# Patient Record
Sex: FEMALE | Race: WHITE | NOT HISPANIC OR LATINO | Employment: FULL TIME | URBAN - METROPOLITAN AREA
[De-identification: names, ages, dates, MRNs, and addresses within clinical notes are randomized per-mention and may not be internally consistent; named-entity substitution may affect disease eponyms.]

---

## 2023-02-27 ENCOUNTER — OFFICE VISIT (OUTPATIENT)
Age: 53
End: 2023-02-27

## 2023-02-27 VITALS
HEIGHT: 63 IN | TEMPERATURE: 98 F | HEART RATE: 92 BPM | DIASTOLIC BLOOD PRESSURE: 88 MMHG | OXYGEN SATURATION: 98 % | BODY MASS INDEX: 29.45 KG/M2 | RESPIRATION RATE: 14 BRPM | WEIGHT: 166.23 LBS | SYSTOLIC BLOOD PRESSURE: 132 MMHG

## 2023-02-27 DIAGNOSIS — Z00.00 ROUTINE GENERAL MEDICAL EXAMINATION AT A HEALTH CARE FACILITY: Primary | ICD-10-CM

## 2023-02-27 DIAGNOSIS — I10 PRIMARY HYPERTENSION: ICD-10-CM

## 2023-02-27 DIAGNOSIS — R73.03 PREDIABETES: ICD-10-CM

## 2023-02-27 DIAGNOSIS — E03.9 HYPOTHYROIDISM, UNSPECIFIED TYPE: ICD-10-CM

## 2023-02-27 DIAGNOSIS — Z12.4 PAP SMEAR FOR CERVICAL CANCER SCREENING: ICD-10-CM

## 2023-02-27 DIAGNOSIS — E78.2 MIXED HYPERLIPIDEMIA: ICD-10-CM

## 2023-02-27 DIAGNOSIS — K21.9 GASTROESOPHAGEAL REFLUX DISEASE WITHOUT ESOPHAGITIS: ICD-10-CM

## 2023-02-27 DIAGNOSIS — K58.9 IRRITABLE BOWEL SYNDROME, UNSPECIFIED TYPE: ICD-10-CM

## 2023-02-27 PROBLEM — K59.9 BOWEL DYSFUNCTION: Status: ACTIVE | Noted: 2023-02-27

## 2023-02-27 RX ORDER — FAMOTIDINE 20 MG/1
TABLET, FILM COATED ORAL
COMMUNITY
Start: 2023-01-31

## 2023-02-27 RX ORDER — LANSOPRAZOLE 30 MG/1
30 CAPSULE, DELAYED RELEASE ORAL DAILY
COMMUNITY

## 2023-02-27 RX ORDER — LEVOTHYROXINE SODIUM 100 MCG
TABLET ORAL
COMMUNITY
Start: 2023-01-28

## 2023-02-27 RX ORDER — DILTIAZEM HYDROCHLORIDE 360 MG/1
CAPSULE, EXTENDED RELEASE ORAL
COMMUNITY
Start: 2022-12-24

## 2023-02-27 RX ORDER — POLYETHYLENE GLYCOL 3350 17 G/17G
POWDER, FOR SOLUTION ORAL
COMMUNITY

## 2023-02-27 NOTE — ASSESSMENT & PLAN NOTE
TSH in normal range  Patient is taking Synthroid 100 mcg daily  Patient denies concerns at this time

## 2023-02-27 NOTE — ASSESSMENT & PLAN NOTE
Patient takes Cardizem 360 mg  Patient denies chest pain, changes in vision, or other concerns at this time  Patient encouraged to complete low-salt diet as well as exercise goal of 30 minutes/day 5 days a week

## 2023-02-27 NOTE — ASSESSMENT & PLAN NOTE
Patient follows digestive disease  Patient reports that she takes Prevacid every other day, as well as famotidine every other day  Patient reports this combination of medication does help with her reflux

## 2023-02-27 NOTE — ASSESSMENT & PLAN NOTE
Patient with 2 elevated fasting blood glucose  Will check A1c at this time  Encouraged limiting carbohydrates and increasing protein and veggies  Encouraged exercise

## 2023-02-27 NOTE — ASSESSMENT & PLAN NOTE
Patient here to establish care  Patient reports regular dental visits and is up-to-date on mammogram   Patient is due for colonoscopy and reports she has an appointment scheduled  Patient is requesting referral to GYN at this time  Patient offers no concerns at this time  Patient declines influenza vaccine at this time

## 2023-02-27 NOTE — PROGRESS NOTES
ADULT ANNUAL [facility]    NAME: Tori Lopez  AGE: 45 y o  SEX: female  : 1970     DATE: 2023     Assessment and Plan:     Problem List Items Addressed This Visit        Digestive    Gastroesophageal reflux disease     Patient follows digestive disease  Patient reports that she takes Prevacid every other day, as well as famotidine every other day  Patient reports this combination of medication does help with her reflux  Relevant Medications    famotidine (PEPCID) 20 mg tablet    lansoprazole (PREVACID) 30 mg capsule    Irritable bowel disease     Sees digestive disease  Due for colonoscopy, has an apt scheduled  Endocrine    Hypothyroidism     TSH in normal range  Patient is taking Synthroid 100 mcg daily  Patient denies concerns at this time  Relevant Medications    Synthroid 100 MCG tablet       Cardiovascular and Mediastinum    Primary hypertension     Patient takes Cardizem 360 mg  Patient denies chest pain, changes in vision, or other concerns at this time  Patient encouraged to complete low-salt diet as well as exercise goal of 30 minutes/day 5 days a week  Relevant Medications    diltiazem (CARDIZEM CD) 360 MG 24 hr capsule       Other    BMI 29 0-29 9,adult     Patient reports that she eats a lot of pasta and quick meals  Discussed increasing protein and veggies with patient as well as portion control  Exercise goal of 30 minutes/day 5 days a week encouraged  Prediabetes     Patient with 2 elevated fasting blood glucose  Will check A1c at this time  Encouraged limiting carbohydrates and increasing protein and veggies  Encouraged exercise  Relevant Orders    Hemoglobin A1C    Pap smear for cervical cancer screening     Patient requesting referral to GYN            Relevant Orders    Ambulatory referral to Gynecology    Mixed hyperlipidemia     Patient with elevated cholesterol  We will recheck lipids at this time  Patient was encouraged on low-fat diet and exercise  Will consider medication if diet and exercise does not control cholesterol  Relevant Orders    Lipid Panel with Direct LDL reflex    Routine general medical examination at a health care facility - Primary     Patient here to establish care  Patient reports regular dental visits and is up-to-date on mammogram   Patient is due for colonoscopy and reports she has an appointment scheduled  Patient is requesting referral to GYN at this time  Patient offers no concerns at this time  Patient declines influenza vaccine at this time  Immunizations and preventive care screenings were discussed with patient today  Appropriate education was printed on patient's after visit summary  Counseling:  Alcohol/drug use: discussed moderation in alcohol intake, the recommendations for healthy alcohol use, and avoidance of illicit drug use  Dental Health: discussed importance of regular tooth brushing, flossing, and dental visits  Injury prevention: discussed safety/seat belts, safety helmets, smoke detectors, carbon dioxide detectors, and smoking near bedding or upholstery  Sexual health: discussed sexually transmitted diseases, partner selection, use of condoms, avoidance of unintended pregnancy, and contraceptive alternatives  · Exercise: the importance of regular exercise/physical activity was discussed  Recommend exercise 3-5 times per week for at least 30 minutes  BMI Counseling: Body mass index is 29 45 kg/m²  The BMI is above normal  Nutrition recommendations include decreasing portion sizes, encouraging healthy choices of fruits and vegetables, decreasing fast food intake, consuming healthier snacks, limiting drinks that contain sugar, moderation in carbohydrate intake, increasing intake of lean protein, reducing intake of saturated and trans fat and reducing intake of cholesterol  Exercise recommendations include moderate physical activity 150 minutes/week  Rationale for BMI follow-up plan is due to patient being overweight or obese  Depression Screening and Follow-up Plan: Patient was screened for depression during today's encounter  They screened negative with a PHQ-2 score of 0  Return in about 6 months (around 8/27/2023)  Chief Complaint:     Chief Complaint   Patient presents with   • Establish Care      History of Present Illness:     Adult Annual Physical   Patient here for a comprehensive physical exam  The patient reports no problems  Diet and Physical Activity  · Diet/Nutrition: well balanced diet  · Exercise: walking  Depression Screening  PHQ-2/9 Depression Screening    Little interest or pleasure in doing things: 0 - not at all  Feeling down, depressed, or hopeless: 0 - not at all  PHQ-2 Score: 0  PHQ-2 Interpretation: Negative depression screen       General Health  · Sleep: sleeps well and gets 4-6 hours of sleep on average  · Hearing: normal - bilateral   · Vision: no vision problems  · Dental: regular dental visits  /GYN Health  · Patient is: postmenopausal total hysterectomy  · Last menstrual period: Total hysterectomy  · Contraceptive method: Total hysterectomy  Review of Systems:     Review of Systems   Constitutional: Negative  HENT: Negative  Eyes: Negative  Respiratory: Negative  Cardiovascular: Negative  Gastrointestinal: Negative  Endocrine: Negative  Genitourinary: Negative  Musculoskeletal: Negative  Neurological: Negative  Hematological: Negative  Psychiatric/Behavioral: Negative         Past Medical History:     Past Medical History:   Diagnosis Date   • Allergic    • Disease of thyroid gland    • GERD (gastroesophageal reflux disease)    • Hypertension    • Inflammatory bowel disease       Past Surgical History:     Past Surgical History:   Procedure Laterality Date   • HYSTERECTOMY Social History:     Social History     Socioeconomic History   • Marital status: /Civil Union     Spouse name: None   • Number of children: None   • Years of education: None   • Highest education level: None   Occupational History   • None   Tobacco Use   • Smoking status: Never   • Smokeless tobacco: Never   Substance and Sexual Activity   • Alcohol use: Yes     Alcohol/week: 14 0 standard drinks     Types: 14 Cans of beer per week   • Drug use: Never   • Sexual activity: Yes     Partners: Male     Birth control/protection: Female Sterilization   Other Topics Concern   • None   Social History Narrative   • None     Social Determinants of Health     Financial Resource Strain: Not on file   Food Insecurity: Not on file   Transportation Needs: Not on file   Physical Activity: Not on file   Stress: Not on file   Social Connections: Not on file   Intimate Partner Violence: Not on file   Housing Stability: Not on file      Family History:     Family History   Problem Relation Age of Onset   • Prostate cancer Father    • Diabetes Paternal Aunt    • Thyroid disease Sister       Current Medications:     Current Outpatient Medications   Medication Sig Dispense Refill   • Calcium-Magnesium-Vitamin D (CITRACAL CALCIUM+D PO) Take by mouth     • diltiazem (CARDIZEM CD) 360 MG 24 hr capsule      • famotidine (PEPCID) 20 mg tablet      • lansoprazole (PREVACID) 30 mg capsule Take 30 mg by mouth daily     • Multiple Vitamins-Minerals (WOMENS MULTI PO) 1 capsule daily     • polyethylene glycol (MIRALAX) 17 g packet      • Synthroid 100 MCG tablet        No current facility-administered medications for this visit  Allergies:      Allergies   Allergen Reactions   • Flax Seed [Bio-Flax] Rash      Physical Exam:     /88 (BP Location: Right arm, Patient Position: Sitting, Cuff Size: Standard)   Pulse 92   Temp 98 °F (36 7 °C) (Tympanic)   Resp 14   Ht 5' 3" (1 6 m)   Wt 75 4 kg (166 lb 3 6 oz)   SpO2 98%   BMI 29 45 kg/m²     Physical Exam  Vitals and nursing note reviewed  Constitutional:       General: She is not in acute distress  Appearance: She is well-developed  HENT:      Head: Normocephalic and atraumatic  Right Ear: Tympanic membrane, ear canal and external ear normal  There is no impacted cerumen  Left Ear: Tympanic membrane, ear canal and external ear normal  There is no impacted cerumen  Nose: Nose normal       Mouth/Throat:      Mouth: Mucous membranes are moist       Pharynx: Oropharynx is clear  Eyes:      General:         Right eye: No discharge  Left eye: No discharge  Conjunctiva/sclera: Conjunctivae normal    Cardiovascular:      Rate and Rhythm: Normal rate and regular rhythm  Heart sounds: Normal heart sounds  No murmur heard  Pulmonary:      Effort: Pulmonary effort is normal  No respiratory distress  Breath sounds: Normal breath sounds  Abdominal:      General: Bowel sounds are normal       Palpations: Abdomen is soft  Tenderness: There is no abdominal tenderness  Musculoskeletal:         General: No swelling  Normal range of motion  Cervical back: Neck supple  Skin:     General: Skin is warm and dry  Capillary Refill: Capillary refill takes less than 2 seconds  Neurological:      Mental Status: She is alert and oriented to person, place, and time  Psychiatric:         Mood and Affect: Mood normal          Behavior: Behavior normal          Thought Content:  Thought content normal          Judgment: Judgment normal           Karolyn Vang, Elyse Irving

## 2023-02-27 NOTE — ASSESSMENT & PLAN NOTE
Patient with elevated cholesterol  We will recheck lipids at this time  Patient was encouraged on low-fat diet and exercise  Will consider medication if diet and exercise does not control cholesterol

## 2023-02-27 NOTE — PATIENT INSTRUCTIONS

## 2023-02-27 NOTE — ASSESSMENT & PLAN NOTE
Patient reports that she eats a lot of pasta and quick meals  Discussed increasing protein and veggies with patient as well as portion control  Exercise goal of 30 minutes/day 5 days a week encouraged

## 2023-02-28 ENCOUNTER — TELEPHONE (OUTPATIENT)
Dept: ADMINISTRATIVE | Facility: OTHER | Age: 53
End: 2023-02-28

## 2023-02-28 NOTE — TELEPHONE ENCOUNTER
Upon review of the In Basket request we were able to locate, review, and update the patient chart as requested for Mammogram     Any additional questions or concerns should be emailed to the Practice Liaisons via the appropriate education email address, please do not reply via In Basket      Thank you  Brigette Barron

## 2023-02-28 NOTE — TELEPHONE ENCOUNTER
----- Message from Carrie Trinh PA-C sent at 2/28/2023 11:47 AM EST -----  02/28/23 11:47 AM    Hello, our patient Tiffanie Jeffries has had Mammogram completed/performed  Please assist in updating the patient chart by pulling the Care Everywhere (CE) document  The date of service is 8/25/22       Thank you,  Carrie Trinh PA-C  [facility]

## 2023-03-02 ENCOUNTER — OFFICE VISIT (OUTPATIENT)
Age: 53
End: 2023-03-02

## 2023-03-02 VITALS
DIASTOLIC BLOOD PRESSURE: 92 MMHG | WEIGHT: 166.6 LBS | HEIGHT: 63 IN | HEART RATE: 86 BPM | SYSTOLIC BLOOD PRESSURE: 158 MMHG | TEMPERATURE: 98.7 F | BODY MASS INDEX: 29.52 KG/M2

## 2023-03-02 DIAGNOSIS — Z90.710 STATUS POST HYSTERECTOMY: ICD-10-CM

## 2023-03-02 DIAGNOSIS — Z01.419 WELL FEMALE EXAM WITH ROUTINE GYNECOLOGICAL EXAM: Primary | ICD-10-CM

## 2023-03-02 DIAGNOSIS — Z12.31 VISIT FOR SCREENING MAMMOGRAM: ICD-10-CM

## 2023-03-02 NOTE — PROGRESS NOTES
Patient here for [address]     Patient is 45y o  year old female G 0 P 0  She is here today for her routine gyn exam  The patient is s/p MARIANA/ BSO for endometriosis/ fibroids with Dr Caesar Lazar  Pt does not smoke  She denies alcohol/drug abuse  She denies domestic violence/abuse  She declines STD/HIV testing  She denies problems with her breasts, bladder, or bowel  She has no complaints today  No hx of abnormal pap smears  We discussed the current ACOG pap guidelines recommendation for no pap smears after hysterectomy  Pt elects no pap today  She has no complaints today  The patients medical, surgical, and family history was reviewed and updated  Last mammogram: 8/25/22    Colonscopy: due this year, scheduling with [facility]  Family History breast cancer:  no  Family History ovarian cancer: no  Family History colon cancer: no      Review of Systems   Constitutional: Negative  HENT: Negative  Eyes: Negative  Respiratory: Negative  Cardiovascular: Negative  Gastrointestinal: Negative  Endocrine: Negative  Genitourinary:        As noted in HPI   Musculoskeletal: Negative  Skin: Negative  Allergic/Immunologic: Negative  Neurological: Negative  Hematological: Negative  Psychiatric/Behavioral: Negative    Current Outpatient Medications on File Prior to Visit   Medication Sig Dispense Refill   • Calcium-Magnesium-Vitamin D (CITRACAL CALCIUM+D PO) Take by mouth     • diltiazem (CARDIZEM CD) 360 MG 24 hr capsule      • famotidine (PEPCID) 20 mg tablet      • lansoprazole (PREVACID) 30 mg capsule Take 30 mg by mouth daily     • Multiple Vitamins-Minerals (WOMENS MULTI PO) 1 capsule daily     • polyethylene glycol (MIRALAX) 17 g packet      • Synthroid 100 MCG tablet        No current facility-administered medications on file prior to visit         Allergies   Allergen Reactions   • Flax Seed [Bio-Flax] Rash       Past Surgical History: Procedure Laterality Date   • HYSTERECTOMY           Vitals:    03/02/23 1357   BP: 158/92   BP Location: Right arm   Patient Position: Sitting   Cuff Size: Adult   Pulse: 86   Temp: 98 7 °F (37 1 °C)   TempSrc: Tympanic   Weight: 75 6 kg (166 lb 9 6 oz)   Height: 5' 3" (1 6 m)           EXAM:    Neck: supple without nodes or thyromegaly  Heart: regular rate and rhythm  Lungs: clear to auscultation without rales, rhonci  Breasts: nontender, no masses, no discoloration, no discharge  Abdomen: soft, nontender, no masses  Ext genitalia: no lesions, no discoloration  Urethra: no discharge or erythema  Bladder: nontender, good support  Vagina: no discharge, no lesions  Cervix: surgically removed  Adnexa: nontender, no masses  Uterus: surgically removed  Rectal: no masses    Assessment:  Routine GYN exam    Plan:  no pap today  Mammogram ordered  Colonoscopy due this year  Will schedule thru [facility]  Recommend healthy diet and regular exercise

## 2023-03-07 ENCOUNTER — APPOINTMENT (OUTPATIENT)
Age: 53
End: 2023-03-07

## 2023-03-07 DIAGNOSIS — R73.03 PREDIABETES: ICD-10-CM

## 2023-03-07 DIAGNOSIS — E78.2 MIXED HYPERLIPIDEMIA: ICD-10-CM

## 2023-03-07 LAB
CHOLEST SERPL-MCNC: 277 MG/DL
EST. AVERAGE GLUCOSE BLD GHB EST-MCNC: 123 MG/DL
HBA1C MFR BLD: 5.9 %
HDLC SERPL-MCNC: 83 MG/DL
LDLC SERPL CALC-MCNC: 170 MG/DL (ref 0–100)
TRIGL SERPL-MCNC: 122 MG/DL

## 2023-03-08 ENCOUNTER — TELEPHONE (OUTPATIENT)
Age: 53
End: 2023-03-08

## 2023-03-08 NOTE — TELEPHONE ENCOUNTER
----- Message from Amey Schwab, [place] sent at 3/8/2023  8:21 AM EST -----  Can you please call the patient and make her aware that her cholesterol is elevated  Total cholesterol is 277, and the goal is less then 200  Her LDL is 170, and goal is less then 100  Additionally her Hgb A1C is 5 9 which signifies prediabetes  At this time I would encourage continued low fat, heart healthy diet, and exercise

## 2023-03-20 ENCOUNTER — TELEPHONE (OUTPATIENT)
Age: 53
End: 2023-03-20

## 2023-03-20 NOTE — TELEPHONE ENCOUNTER
Left message for patient to call back in regards to scheduling an appointment for a lump on her hip  160

## 2023-03-23 ENCOUNTER — OFFICE VISIT (OUTPATIENT)
Age: 53
End: 2023-03-23

## 2023-03-23 VITALS
HEART RATE: 84 BPM | BODY MASS INDEX: 29.6 KG/M2 | SYSTOLIC BLOOD PRESSURE: 150 MMHG | DIASTOLIC BLOOD PRESSURE: 90 MMHG | WEIGHT: 167.11 LBS | TEMPERATURE: 98 F | OXYGEN SATURATION: 97 % | RESPIRATION RATE: 16 BRPM

## 2023-03-23 DIAGNOSIS — R22.42 MASS OF LEFT HIP REGION: Primary | ICD-10-CM

## 2023-03-23 DIAGNOSIS — I10 PRIMARY HYPERTENSION: ICD-10-CM

## 2023-03-23 DIAGNOSIS — E03.9 HYPOTHYROIDISM, UNSPECIFIED TYPE: ICD-10-CM

## 2023-03-23 PROBLEM — R74.8 ELEVATED LIVER ENZYMES: Status: ACTIVE | Noted: 2023-03-23

## 2023-03-23 RX ORDER — LEVOTHYROXINE SODIUM 100 MCG
100 TABLET ORAL DAILY
Qty: 90 TABLET | Refills: 1 | Status: SHIPPED | OUTPATIENT
Start: 2023-03-23

## 2023-03-23 RX ORDER — LISINOPRIL 10 MG/1
10 TABLET ORAL DAILY
Qty: 30 TABLET | Refills: 3 | Status: SHIPPED | OUTPATIENT
Start: 2023-03-23

## 2023-03-23 RX ORDER — DILTIAZEM HYDROCHLORIDE 360 MG/1
360 CAPSULE, EXTENDED RELEASE ORAL DAILY
Qty: 90 CAPSULE | Refills: 1 | Status: SHIPPED | OUTPATIENT
Start: 2023-03-23

## 2023-03-23 NOTE — ASSESSMENT & PLAN NOTE
BP elevated today  Patient reporting home blood pressures borderline/elevated  Will add lisinopril at this time  Patient denies chest pain, change in vision, or headaches  Encouraged low-salt diet  Follow-up in 2 weeks for blood pressure check  Patient is to continue with home blood pressures and contact if blood pressures are above 130/90  Continue home blood pressure checks

## 2023-03-23 NOTE — ASSESSMENT & PLAN NOTE
Patient reporting a mass on her left hip which causes occasional 2 out of 10 pain in the area of the mass and down the leg  Patient reports the mass has been there for "weeks "  Patient reports over 10 years ago she had a fatty cyst removed from the same area  Patient denies swelling, redness, drainage, fever  No signs of infection noted on exam today  Will ultrasound for further eval at this time

## 2023-03-23 NOTE — PATIENT INSTRUCTIONS
Hypertension   WHAT YOU NEED TO KNOW:   Hypertension is high blood pressure  Your blood pressure is the force of your blood moving against the walls of your arteries  Hypertension causes your blood pressure to get so high that your heart has to work much harder than normal  This can damage your heart  Hypertension that does not respond to medicines and lifestyle changes is called resistant hypertension  Hypertension is considered chronic when it continues for 3 months or longer  DISCHARGE INSTRUCTIONS:   Call your local emergency number (911 in the [address] [facility]) or have someone call if:   You have chest pain  You have any of the following signs of a heart attack:      Squeezing, pressure, or pain in your chest    You may  also have any of the following:     Discomfort or pain in your back, neck, jaw, stomach, or arm    Shortness of breath    Nausea or vomiting    Lightheadedness or a sudden cold sweat    You become confused or have trouble speaking  You suddenly feel lightheaded or have trouble breathing  Return to the emergency department if:   You have a severe headache or vision loss  You have weakness in an arm or leg  Call your doctor or cardiologist if:   You feel faint, dizzy, confused, or drowsy  You have been taking your blood pressure medicine but your pressure is higher than your provider says it should be  You have questions or concerns about your condition or care  Medicines: You may  need any of the following:  Antihypertensives  may be used to help lower your blood pressure  Several kinds of medicines are available  Your healthcare provider will choose medicines based on the kind of hypertension you have  You may need more than one type of medicine  Take the medicine exactly as directed  Diuretics  help decrease extra fluid that collects in your body  This will help lower your blood pressure  You may urinate more often while you take this medicine      Cholesterol medicine  helps lower your cholesterol level  A low cholesterol level helps prevent heart disease and makes it easier to control your blood pressure  Take your medicine as directed  Contact your healthcare provider if you think your medicine is not helping or if you have side effects  Tell your provider if you are allergic to any medicine  Keep a list of the medicines, vitamins, and herbs you take  Include the amounts, and when and why you take them  Bring the list or the pill bottles to follow-up visits  Carry your medicine list with you in case of an emergency  Follow up with your doctor or cardiologist as directed: You will need to return to have your blood pressure checked and to have other lab tests done  Write down your questions so you remember to ask them during your visits  Stages of hypertension:  Your healthcare provider will give you a blood pressure goal based on your age, health, and risk for cardiovascular disease  The following are general guidelines on the stages of hypertension:  Normal blood pressure is 119/79 or lower   Your healthcare provider may only check your blood pressure each year if it stays at a normal level  Elevated blood pressure is 120/79 to 129/79   This is sometimes called prehypertension  Your healthcare provider may suggest lifestyle changes to help lower your blood pressure to a normal level  He or she may then check it again in 3 to 6 months  Stage 1 hypertension is 130/80  to 139/89   Your provider may recommend lifestyle changes, medication, and checks every 3 to 6 months until your blood pressure is controlled  Stage 2 hypertension is 140/90 or higher   Your provider will recommend lifestyle changes and have you take 2 kinds of hypertension medicines  You will also need to have your blood pressure checked monthly until it is controlled  Manage hypertension:   Check your blood pressure at home    Do not smoke, have caffeine, or exercise for at least 30 minutes before you check your blood pressure  Sit and rest for 5 minutes before you check your blood pressure  Extend your arm and support it on a flat surface  Your arm should be at the same level as your heart  Follow the directions that came with your blood pressure monitor  Check your blood pressure 2 times, 1 minute apart, before you take your medicine in the morning  Also check your blood pressure before your evening meal  Keep a record of your readings and bring it to your follow-up visits  Ask your healthcare provider what your blood pressure should be  Manage any other health conditions you have  Health conditions such as diabetes can increase your risk for hypertension  Follow your healthcare provider's instructions and take all your medicines as directed  Ask about all medicines  Certain medicines can increase your blood pressure  Examples include oral birth control pills, decongestants, herbal supplements, and NSAIDs, such as ibuprofen  Your healthcare provider can tell you which medicines are safe for you to take  This includes prescription and over-the-counter medicines  Lifestyle changes you can make to manage hypertension:  Your healthcare provider may recommend you work with a team to manage hypertension  The team may include medical experts such as a dietitian, an exercise or physical therapist, and a behavior therapist  Your family members may be included in helping you create lifestyle changes  Limit sodium (salt) as directed  Too much sodium can affect your fluid balance  Check labels to find low-sodium or no-salt-added foods  Some low-sodium foods use potassium salts for flavor  Too much potassium can also cause health problems  Your healthcare provider will tell you how much sodium and potassium are safe for you to have in a day  He or she may recommend that you limit sodium to 2,300 mg a day  Follow the meal plan recommended by your healthcare provider    A dietitian or your provider can give you more information on low-sodium plans or the DASH (Dietary Approaches to Stop Hypertension) eating plan  The DASH plan is low in sodium, processed sugar, unhealthy fats, and total fat  It is high in potassium, calcium, and fiber  These can be found in vegetables, fruit, and whole-grain foods  Be physically active throughout the day  Physical activity, such as exercise, can help control your blood pressure and your weight  Be physically active for at least 30 minutes per day, on most days of the week  Include aerobic activity, such as walking or riding a bicycle  Also include strength training at least 2 times each week  Your healthcare providers can help you create a physical activity plan  Decrease stress  This may help lower your blood pressure  Learn ways to relax, such as deep breathing or listening to music  Limit alcohol as directed  Alcohol can increase your blood pressure  A drink of alcohol is 12 ounces of beer, 5 ounces of wine, or 1½ ounces of liquor  Do not smoke  Nicotine and other chemicals in cigarettes and cigars can increase your blood pressure and also cause lung damage  Ask your healthcare provider for information if you currently smoke and need help to quit  E-cigarettes or smokeless tobacco still contain nicotine  Talk to your healthcare provider before you use these products  © Copyright [employer] 2022 Information is for End User's use only and may not be sold, redistributed or otherwise used for commercial purposes  The above information is an  only  It is not intended as medical advice for individual conditions or treatments  Talk to your doctor, nurse or pharmacist before following any medical regimen to see if it is safe and effective for you

## 2023-03-23 NOTE — PROGRESS NOTES
Name: Ness Emanuel      : 1970      MRN: [id number]  Encounter Provider: ARNOLD Ro  Encounter Date: 3/23/2023   Encounter department: [address] [place] [address] PRIMARY CARE    Assessment & Plan     1  Mass of left hip region  Assessment & Plan:  Patient reporting a mass on her left hip which causes occasional 2 out of 10 pain in the area of the mass and down the leg  Patient reports the mass has been there for "weeks "  Patient reports over 10 years ago she had a fatty cyst removed from the same area  Patient denies swelling, redness, drainage, fever  No signs of infection noted on exam today  Will ultrasound for further eval at this time  Orders:  -     US head neck soft tissue; Future    2  Primary hypertension  Assessment & Plan:  BP elevated today  Patient reporting home blood pressures borderline/elevated  Will add lisinopril at this time  Patient denies chest pain, change in vision, or headaches  Encouraged low-salt diet  Follow-up in 2 weeks for blood pressure check  Patient is to continue with home blood pressures and contact if blood pressures are above 130/90  Continue home blood pressure checks  Orders:  -     lisinopril (ZESTRIL) 10 mg tablet; Take 1 tablet (10 mg total) by mouth daily  -     diltiazem (CARDIZEM CD) 360 MG 24 hr capsule; Take 1 capsule (360 mg total) by mouth daily    3  Hypothyroidism, unspecified type  Assessment & Plan:  Patient with partial thyroidectomy in   Patient currently taking Synthroid 100 mcg  TSH  was normal   Refill sent today  Orders:  -     Synthroid 100 MCG tablet; Take 1 tablet (100 mcg total) by mouth daily Take in the morning 30 mins before breakfast           Subjective        Patient is here today for evaluation of a mass on her left hip which has been present for "weeks"    Patient reports she had a mass removed over 10 years ago which was a fatty cyst   Patient reports another mass has grown in the same area and causes her occasional 2 out of 10 pain which shoots down her leg  Patient is interested in an ultrasound of the mass and possible removal at this time  Patient denies redness, swelling, or drainage from the area  Patient's blood pressure elevated today  Patient reports home blood pressures are around 130-135/80-95  Patient reports blood pressure this morning was 150/92  Patient denies headaches, chest pain, change in vision  Review of Systems   Constitutional: Negative  HENT: Negative  Eyes: Negative  Respiratory: Negative  Cardiovascular: Negative  Gastrointestinal: Negative  Genitourinary: Negative  Musculoskeletal: Negative  Skin: Negative for color change, pallor, rash and wound  Reporting mass on left hip   Neurological: Negative  Hematological: Negative  Psychiatric/Behavioral: Negative  Current Outpatient Medications on File Prior to Visit   Medication Sig   • Calcium-Magnesium-Vitamin D (CITRACAL CALCIUM+D PO) Take by mouth   • famotidine (PEPCID) 20 mg tablet    • lansoprazole (PREVACID) 30 mg capsule Take 30 mg by mouth daily   • Multiple Vitamins-Minerals (WOMENS MULTI PO) 1 capsule daily   • polyethylene glycol (MIRALAX) 17 g packet    • [DISCONTINUED] diltiazem (CARDIZEM CD) 360 MG 24 hr capsule    • [DISCONTINUED] Synthroid 100 MCG tablet        Objective     /90 (BP Location: Right arm, Patient Position: Sitting, Cuff Size: Standard)   Pulse 84   Temp 98 °F (36 7 °C)   Resp 16   Wt 75 8 kg (167 lb 1 7 oz)   SpO2 97%   BMI 29 60 kg/m²     Physical Exam  Vitals and nursing note reviewed  Constitutional:       General: She is not in acute distress  Appearance: Normal appearance  She is not ill-appearing, toxic-appearing or diaphoretic  HENT:      Head: Normocephalic and atraumatic  Nose: Nose normal    Eyes:      General:         Right eye: No discharge  Left eye: No discharge        Conjunctiva/sclera: Conjunctivae normal    Cardiovascular:      Rate and Rhythm: Regular rhythm  Heart sounds: Normal heart sounds  Pulmonary:      Effort: Pulmonary effort is normal       Breath sounds: Normal breath sounds  Abdominal:      General: Bowel sounds are normal       Palpations: Abdomen is soft  Musculoskeletal:         General: Normal range of motion  Cervical back: Normal range of motion and neck supple  Right lower leg: No edema  Left lower leg: No edema  Lymphadenopathy:      Cervical: No cervical adenopathy  Skin:     General: Skin is warm and dry  Comments: Small mobile 1inx0 5in, non tender, oblong mass noted on left hip above previous scar  No redness, swelling, drainage noted  Neurological:      Mental Status: She is alert and oriented to person, place, and time  Gait: Gait normal    Psychiatric:         Mood and Affect: Mood normal          Behavior: Behavior normal          Thought Content:  Thought content normal          Judgment: Judgment normal        ARNOLD Sommers

## 2023-03-23 NOTE — ASSESSMENT & PLAN NOTE
Patient with partial thyroidectomy in 1997  Patient currently taking Synthroid 100 mcg  TSH 11/23 was normal   Refill sent today

## 2023-04-04 ENCOUNTER — RA CDI HCC (OUTPATIENT)
Dept: OTHER | Facility: HOSPITAL | Age: 53
End: 2023-04-04

## 2023-04-04 NOTE — PROGRESS NOTES
[facility] 75  coding opportunities       Chart reviewed, no opportunity found: CHART REVIEWED, NO OPPORTUNITY FOUND        Patients Insurance        Commercial Insurance: [employer]

## 2023-04-05 ENCOUNTER — TELEPHONE (OUTPATIENT)
Age: 53
End: 2023-04-05

## 2023-04-05 NOTE — TELEPHONE ENCOUNTER
Called patient and left message to call back in regards to rescheduling her appointment on 08/28/2023

## 2023-04-28 PROBLEM — Z00.00 ROUTINE GENERAL MEDICAL EXAMINATION AT A HEALTH CARE FACILITY: Status: RESOLVED | Noted: 2023-02-27 | Resolved: 2023-04-28

## 2023-07-13 ENCOUNTER — TELEPHONE (OUTPATIENT)
Age: 53
End: 2023-07-13

## 2023-07-13 ENCOUNTER — APPOINTMENT (OUTPATIENT)
Age: 53
End: 2023-07-13
Payer: COMMERCIAL

## 2023-07-13 DIAGNOSIS — R79.89 ABNORMAL LFTS: ICD-10-CM

## 2023-07-13 DIAGNOSIS — E78.2 MIXED HYPERLIPIDEMIA: ICD-10-CM

## 2023-07-13 DIAGNOSIS — E03.9 HYPOTHYROIDISM, UNSPECIFIED TYPE: ICD-10-CM

## 2023-07-13 DIAGNOSIS — R74.8 ELEVATED LIVER ENZYMES: ICD-10-CM

## 2023-07-13 DIAGNOSIS — R73.03 PREDIABETES: ICD-10-CM

## 2023-07-13 DIAGNOSIS — Z13.0 SCREENING FOR DEFICIENCY ANEMIA: ICD-10-CM

## 2023-07-13 DIAGNOSIS — I10 PRIMARY HYPERTENSION: ICD-10-CM

## 2023-07-13 LAB
ALBUMIN SERPL BCP-MCNC: 4.1 G/DL (ref 3.5–5)
ALP SERPL-CCNC: 205 U/L (ref 46–116)
ALT SERPL W P-5'-P-CCNC: 131 U/L (ref 12–78)
ANION GAP SERPL CALCULATED.3IONS-SCNC: 7 MMOL/L
AST SERPL W P-5'-P-CCNC: 84 U/L (ref 5–45)
BILIRUB DIRECT SERPL-MCNC: 0.29 MG/DL (ref 0–0.2)
BILIRUB SERPL-MCNC: 0.98 MG/DL (ref 0.2–1)
BUN SERPL-MCNC: 14 MG/DL (ref 5–25)
CALCIUM SERPL-MCNC: 10.2 MG/DL (ref 8.3–10.1)
CHLORIDE SERPL-SCNC: 100 MMOL/L (ref 96–108)
CHOLEST SERPL-MCNC: 289 MG/DL
CO2 SERPL-SCNC: 24 MMOL/L (ref 21–32)
CREAT SERPL-MCNC: 0.92 MG/DL (ref 0.6–1.3)
EST. AVERAGE GLUCOSE BLD GHB EST-MCNC: 114 MG/DL
GFR SERPL CREATININE-BSD FRML MDRD: 70 ML/MIN/1.73SQ M
GLUCOSE P FAST SERPL-MCNC: 120 MG/DL (ref 65–99)
HBA1C MFR BLD: 5.6 %
HDLC SERPL-MCNC: 90 MG/DL
LDLC SERPL CALC-MCNC: 177 MG/DL (ref 0–100)
NONHDLC SERPL-MCNC: 199 MG/DL
POTASSIUM SERPL-SCNC: 4.4 MMOL/L (ref 3.5–5.3)
PROT SERPL-MCNC: 8.1 G/DL (ref 6.4–8.4)
SODIUM SERPL-SCNC: 131 MMOL/L (ref 135–147)
TRIGL SERPL-MCNC: 108 MG/DL
TSH SERPL DL<=0.05 MIU/L-ACNC: 0.78 UIU/ML (ref 0.45–4.5)

## 2023-07-13 PROCEDURE — 84443 ASSAY THYROID STIM HORMONE: CPT

## 2023-07-13 PROCEDURE — 83036 HEMOGLOBIN GLYCOSYLATED A1C: CPT

## 2023-07-13 PROCEDURE — 36415 COLL VENOUS BLD VENIPUNCTURE: CPT

## 2023-07-13 PROCEDURE — 82248 BILIRUBIN DIRECT: CPT

## 2023-07-13 PROCEDURE — 80053 COMPREHEN METABOLIC PANEL: CPT

## 2023-07-13 PROCEDURE — 80061 LIPID PANEL: CPT

## 2023-07-13 NOTE — TELEPHONE ENCOUNTER
Patient went to have labs drawn and was told they could not draw CBC and differential.  Order . Please add new order.

## 2023-07-13 NOTE — TELEPHONE ENCOUNTER
Please call patient I received labs that were ordered by her PCP. Please make sure patient follows up with PCP as her cholesterol, blood sugar, liver enzymes are abnormal and need addressing.

## 2023-07-14 ENCOUNTER — TELEPHONE (OUTPATIENT)
Age: 53
End: 2023-07-14

## 2023-07-14 DIAGNOSIS — R79.89 LOW SERUM SODIUM: Primary | ICD-10-CM

## 2023-07-14 NOTE — TELEPHONE ENCOUNTER
Called patient and let her know she needs to get repeat lab work done, patient is going to be on vacation all next week but will get it completed when she comes back. Follow Up appt scheduled for 8/2/2023.

## 2023-07-14 NOTE — TELEPHONE ENCOUNTER
----- Message from Tha Underwood, [address] [place] [address] sent at 7/14/2023  7:02 AM EDT -----  Regarding: Lab Results  Good morning! Please call the patient and let her know that I would like to repeat a few of her labs for confirmation purposes and follow-up with her in the office shortly after to discuss the results. Ideally, I would like her to repeat the labs next week or the following, and then return to the office in late July for a lab follow-up. Please let me know if the patient is agreeable and I will place the necessary orders.   Thank you so much!    ----- Message -----  From: Lab, Background User  Sent: 7/13/2023   4:35 PM EDT  To: ARNOLD Hallman

## 2023-07-31 ENCOUNTER — APPOINTMENT (OUTPATIENT)
Age: 53
End: 2023-07-31
Payer: COMMERCIAL

## 2023-07-31 DIAGNOSIS — R79.89 LOW SERUM SODIUM: ICD-10-CM

## 2023-07-31 LAB
ANION GAP SERPL CALCULATED.3IONS-SCNC: 8 MMOL/L
BASOPHILS # BLD AUTO: 0.08 THOUSANDS/ÂΜL (ref 0–0.1)
BASOPHILS NFR BLD AUTO: 1 % (ref 0–1)
BUN SERPL-MCNC: 11 MG/DL (ref 5–25)
CALCIUM SERPL-MCNC: 10 MG/DL (ref 8.3–10.1)
CHLORIDE SERPL-SCNC: 101 MMOL/L (ref 96–108)
CO2 SERPL-SCNC: 24 MMOL/L (ref 21–32)
CREAT SERPL-MCNC: 0.78 MG/DL (ref 0.6–1.3)
EOSINOPHIL # BLD AUTO: 0.06 THOUSAND/ÂΜL (ref 0–0.61)
EOSINOPHIL NFR BLD AUTO: 1 % (ref 0–6)
ERYTHROCYTE [DISTWIDTH] IN BLOOD BY AUTOMATED COUNT: 11.8 % (ref 11.6–15.1)
GFR SERPL CREATININE-BSD FRML MDRD: 86 ML/MIN/1.73SQ M
GLUCOSE P FAST SERPL-MCNC: 119 MG/DL (ref 65–99)
HCT VFR BLD AUTO: 40.3 % (ref 34.8–46.1)
HGB BLD-MCNC: 13.8 G/DL (ref 11.5–15.4)
IMM GRANULOCYTES # BLD AUTO: 0.07 THOUSAND/UL (ref 0–0.2)
IMM GRANULOCYTES NFR BLD AUTO: 1 % (ref 0–2)
LYMPHOCYTES # BLD AUTO: 3.63 THOUSANDS/ÂΜL (ref 0.6–4.47)
LYMPHOCYTES NFR BLD AUTO: 36 % (ref 14–44)
MCH RBC QN AUTO: 34.1 PG (ref 26.8–34.3)
MCHC RBC AUTO-ENTMCNC: 34.2 G/DL (ref 31.4–37.4)
MCV RBC AUTO: 100 FL (ref 82–98)
MONOCYTES # BLD AUTO: 0.94 THOUSAND/ÂΜL (ref 0.17–1.22)
MONOCYTES NFR BLD AUTO: 9 % (ref 4–12)
NEUTROPHILS # BLD AUTO: 5.28 THOUSANDS/ÂΜL (ref 1.85–7.62)
NEUTS SEG NFR BLD AUTO: 52 % (ref 43–75)
NRBC BLD AUTO-RTO: 0 /100 WBCS
PLATELET # BLD AUTO: 302 THOUSANDS/UL (ref 149–390)
PMV BLD AUTO: 9.8 FL (ref 8.9–12.7)
POTASSIUM SERPL-SCNC: 4.8 MMOL/L (ref 3.5–5.3)
RBC # BLD AUTO: 4.05 MILLION/UL (ref 3.81–5.12)
SODIUM SERPL-SCNC: 133 MMOL/L (ref 135–147)
WBC # BLD AUTO: 10.06 THOUSAND/UL (ref 4.31–10.16)

## 2023-07-31 PROCEDURE — 85025 COMPLETE CBC W/AUTO DIFF WBC: CPT

## 2023-07-31 PROCEDURE — 36415 COLL VENOUS BLD VENIPUNCTURE: CPT

## 2023-07-31 PROCEDURE — 80048 BASIC METABOLIC PNL TOTAL CA: CPT

## 2023-08-01 PROBLEM — E87.1: Status: ACTIVE | Noted: 2023-08-01

## 2023-08-01 NOTE — ASSESSMENT & PLAN NOTE
Still noted in recent CMP. Currently following with [address] in [address] (Dr. Angi Rubio).

## 2023-08-01 NOTE — ASSESSMENT & PLAN NOTE
- March labs: 131, recent BMP from this week shows sodium now at 133. - May be pseudohyponatremia secondary to lipemic serum.  -Patient reports "it seems like every time they check my lab work my sodium is a little low". This finding was verified in the patient's electronic medical record. - Will continue workup with serum osmolarity test. If WNL, no further workup is indicated.

## 2023-08-02 ENCOUNTER — OFFICE VISIT (OUTPATIENT)
Age: 53
End: 2023-08-02
Payer: COMMERCIAL

## 2023-08-02 VITALS
SYSTOLIC BLOOD PRESSURE: 132 MMHG | HEIGHT: 63 IN | HEART RATE: 80 BPM | OXYGEN SATURATION: 97 % | RESPIRATION RATE: 16 BRPM | BODY MASS INDEX: 29.92 KG/M2 | WEIGHT: 168.87 LBS | DIASTOLIC BLOOD PRESSURE: 82 MMHG | TEMPERATURE: 97.7 F

## 2023-08-02 DIAGNOSIS — E78.2 MIXED HYPERLIPIDEMIA: ICD-10-CM

## 2023-08-02 DIAGNOSIS — Z12.11 COLON CANCER SCREENING: ICD-10-CM

## 2023-08-02 DIAGNOSIS — E03.9 HYPOTHYROIDISM, UNSPECIFIED TYPE: ICD-10-CM

## 2023-08-02 DIAGNOSIS — I10 PRIMARY HYPERTENSION: Primary | ICD-10-CM

## 2023-08-02 DIAGNOSIS — R74.8 ELEVATED LIVER ENZYMES: ICD-10-CM

## 2023-08-02 DIAGNOSIS — R73.03 PREDIABETES: ICD-10-CM

## 2023-08-02 DIAGNOSIS — E87.1 SERUM SODIUM DECREASED: ICD-10-CM

## 2023-08-02 DIAGNOSIS — R22.42 MASS OF LEFT HIP REGION: ICD-10-CM

## 2023-08-02 PROCEDURE — 99214 OFFICE O/P EST MOD 30 MIN: CPT | Performed by: NURSE PRACTITIONER

## 2023-08-02 NOTE — ASSESSMENT & PLAN NOTE
Patient has yet to schedule with dermatology to assess her suspected left hip lipoma. New referral order placed today and faxed to [facility] in [facility].

## 2023-08-02 NOTE — ASSESSMENT & PLAN NOTE
Blood pressure in the office today 132/82. Blood pressure per patient report this morning at home was 122/86. Patient currently on lisinopril 10 mg daily and diltiazem 360 mg daily. No med changes, encouraged continued exercise and low-salt diet.

## 2023-08-02 NOTE — ASSESSMENT & PLAN NOTE
LDL continues to be elevated in the 170s. However, the patient's ASCVD risk score is 2.4%. Patiently currently in the process of losing weight, lost approximately 4 pounds since her last visit. Will reassess lipid panel prior to next follow-up.

## 2023-08-02 NOTE — PROGRESS NOTES
Name: Bibiana Casey      : 1970      MRN: [id number]  Encounter Provider: ARNOLD Munguia  Encounter Date: 2023   Encounter department: [facility] specific charting below    Patient is due for a colonoscopy, she is seeing Dr. Alecia Ta with GI later this month to discuss elevated LFTs. Patient plans on asking him to order a colonoscopy at that time    1. Primary hypertension  Assessment & Plan:  Blood pressure in the office today 132/82. Blood pressure per patient report this morning at home was 122/86. Patient currently on lisinopril 10 mg daily and diltiazem 360 mg daily. No med changes, encouraged continued exercise and low-salt diet. Orders:  -     CBC and differential; Future    2. Serum sodium decreased  Assessment & Plan:  - March labs: 131, recent BMP from this week shows sodium now at 133. - May be pseudohyponatremia secondary to lipemic serum.  -Patient reports "it seems like every time they check my lab work my sodium is a little low". This finding was verified in the patient's electronic medical record. - Will continue workup with serum osmolarity test. If WNL, no further workup is indicated. Orders:  -     Osmolality-"If this is regarding a toxic alcohol, STOP. Test is not routinely indicated. Please consult medical  on call for further guidance."; Future  -     CBC and differential; Future  -     Comprehensive metabolic panel; Future    3. Mixed hyperlipidemia  Assessment & Plan:  LDL continues to be elevated in the 170s. However, the patient's ASCVD risk score is 2.4%. Patiently currently in the process of losing weight, lost approximately 4 pounds since her last visit. Will reassess lipid panel prior to next follow-up. Orders:  -     CBC and differential; Future  -     Lipid Panel with Direct LDL reflex; Future    4. Elevated liver enzymes  Assessment & Plan:  Still noted in recent CMP. Currently following with [address] in [address] [place] [address] (Dr. Rosalee Garcia). Orders:  -     CBC and differential; Future  -     Comprehensive metabolic panel; Future    5. Prediabetes  Assessment & Plan:  A1C from July 5.6%, down from 5.9% in March. Encouraged continued diet and exercise. Orders:  -     CBC and differential; Future  -     Comprehensive metabolic panel; Future  -     Hemoglobin A1C; Future    6. Hypothyroidism, unspecified type  Assessment & Plan:  July 2023 TSH: WNL    Orders:  -     CBC and differential; Future  -     TSH, 3rd generation with Free T4 reflex; Future    7. Colon cancer screening    8. Mass of left hip region  Assessment & Plan:  Patient has yet to schedule with dermatology to assess her suspected left hip lipoma. New referral order placed today and faxed to [facility] in [facility]. Orders:  -     Ambulatory Referral to Dermatology; Future      I have spent a total time of 35 minutes on 08/02/23 in caring for this patient including Risks and benefits of tx options, Instructions for management, Patient and family education, Importance of tx compliance and Impressions. Subjective      Routine follow-up to discuss lab results  No complaints    Review of Systems   Constitutional: Negative. HENT: Negative. Eyes: Negative. Respiratory: Negative. Negative for shortness of breath. Cardiovascular: Negative. Negative for chest pain, palpitations and leg swelling. Gastrointestinal: Negative. Endocrine: Negative. Genitourinary: Negative. Musculoskeletal: Negative. Skin: Negative. Allergic/Immunologic: Negative. Neurological: Negative. Hematological: Negative. Psychiatric/Behavioral: Negative.         Current Outpatient Medications on File Prior to Visit   Medication Sig   • Calcium-Magnesium-Vitamin D (CITRACAL CALCIUM+D PO) Take by mouth   • diltiazem (CARDIZEM CD) 360 MG 24 hr capsule Take 1 capsule (360 mg total) by mouth daily   • famotidine (PEPCID) 20 mg tablet    • lansoprazole (PREVACID) 30 mg capsule Take 30 mg by mouth daily   • lisinopril (ZESTRIL) 10 mg tablet Take 1 tablet (10 mg total) by mouth daily   • Multiple Vitamins-Minerals (WOMENS MULTI PO) 1 capsule daily   • Multiple Vitamins-Minerals (ZINC PO) Take by mouth   • polyethylene glycol (GLYCOLAX) 17 GM/SCOOP powder TAKE 17 GRAMS (1 CAPFUL) DISSOLVED IN WATER ONCE DAILY   • QUERCETIN PO Take by mouth   • Synthroid 100 MCG tablet Take 1 tablet (100 mcg total) by mouth daily Take in the morning 30 mins before breakfast       Objective     /82 (BP Location: Left arm, Patient Position: Sitting, Cuff Size: Large)   Pulse 80   Temp 97.7 °F (36.5 °C) (Tympanic)   Resp 16   Ht 5' 3" (1.6 m)   Wt 76.6 kg (168 lb 14 oz)   SpO2 97%   BMI 29.91 kg/m²     Physical Exam  Constitutional:       General: She is not in acute distress. Appearance: Normal appearance. HENT:      Nose: Nose normal.   Eyes:      Extraocular Movements: Extraocular movements intact. Conjunctiva/sclera: Conjunctivae normal.   Cardiovascular:      Rate and Rhythm: Normal rate and regular rhythm. Heart sounds: Normal heart sounds. Pulmonary:      Effort: Pulmonary effort is normal. No respiratory distress. Breath sounds: No wheezing. Abdominal:      General: Abdomen is flat. Musculoskeletal:         General: No swelling or deformity. Normal range of motion. Cervical back: Normal range of motion. Skin:     Findings: No erythema or rash. Neurological:      Mental Status: She is alert and oriented to person, place, and time.    Psychiatric:         Mood and Affect: Mood normal.         Behavior: Behavior normal.       ARNOLD Greer

## 2023-09-11 DIAGNOSIS — I10 PRIMARY HYPERTENSION: ICD-10-CM

## 2023-09-11 RX ORDER — DILTIAZEM HYDROCHLORIDE 360 MG/1
360 CAPSULE, EXTENDED RELEASE ORAL DAILY
Qty: 90 CAPSULE | Refills: 3 | Status: SHIPPED | OUTPATIENT
Start: 2023-09-11

## 2023-11-14 ENCOUNTER — TELEPHONE (OUTPATIENT)
Age: 53
End: 2023-11-14

## 2023-11-14 DIAGNOSIS — E03.9 HYPOTHYROIDISM, UNSPECIFIED TYPE: ICD-10-CM

## 2023-11-14 RX ORDER — LEVOTHYROXINE SODIUM 100 MCG
100 TABLET ORAL DAILY
Qty: 90 TABLET | Refills: 1 | Status: SHIPPED | OUTPATIENT
Start: 2023-11-14

## 2023-11-14 NOTE — TELEPHONE ENCOUNTER
Patient called requesting a refill of her Synthroid 100mcg but is requesting generic Levothyroxin 100mcg. Please send to [facility]. 90 day supply with refills.

## 2024-02-23 DIAGNOSIS — I10 PRIMARY HYPERTENSION: ICD-10-CM

## 2024-02-23 RX ORDER — LISINOPRIL 10 MG/1
10 TABLET ORAL DAILY
Qty: 90 TABLET | Refills: 1 | Status: SHIPPED | OUTPATIENT
Start: 2024-02-23

## 2024-03-01 ENCOUNTER — RA CDI HCC (OUTPATIENT)
Dept: OTHER | Facility: HOSPITAL | Age: 54
End: 2024-03-01

## 2024-03-01 ENCOUNTER — APPOINTMENT (OUTPATIENT)
Age: 54
End: 2024-03-01
Payer: COMMERCIAL

## 2024-03-01 DIAGNOSIS — E87.1 SERUM SODIUM DECREASED: ICD-10-CM

## 2024-03-01 DIAGNOSIS — R73.03 PREDIABETES: ICD-10-CM

## 2024-03-01 DIAGNOSIS — I10 PRIMARY HYPERTENSION: ICD-10-CM

## 2024-03-01 DIAGNOSIS — R74.8 ELEVATED LIVER ENZYMES: ICD-10-CM

## 2024-03-01 DIAGNOSIS — E03.9 HYPOTHYROIDISM, UNSPECIFIED TYPE: ICD-10-CM

## 2024-03-01 DIAGNOSIS — E78.2 MIXED HYPERLIPIDEMIA: ICD-10-CM

## 2024-03-01 LAB
ALBUMIN SERPL BCP-MCNC: 4.5 G/DL (ref 3.5–5)
ALP SERPL-CCNC: 130 U/L (ref 34–104)
ALT SERPL W P-5'-P-CCNC: 72 U/L (ref 7–52)
ANION GAP SERPL CALCULATED.3IONS-SCNC: 12 MMOL/L
AST SERPL W P-5'-P-CCNC: 40 U/L (ref 13–39)
BASOPHILS # BLD AUTO: 0.06 THOUSANDS/ÂΜL (ref 0–0.1)
BASOPHILS NFR BLD AUTO: 1 % (ref 0–1)
BILIRUB SERPL-MCNC: 0.6 MG/DL (ref 0.2–1)
BUN SERPL-MCNC: 12 MG/DL (ref 5–25)
CALCIUM SERPL-MCNC: 9.8 MG/DL (ref 8.4–10.2)
CHLORIDE SERPL-SCNC: 95 MMOL/L (ref 96–108)
CHOLEST SERPL-MCNC: 264 MG/DL
CO2 SERPL-SCNC: 29 MMOL/L (ref 21–32)
CREAT SERPL-MCNC: 0.7 MG/DL (ref 0.6–1.3)
EOSINOPHIL # BLD AUTO: 0.06 THOUSAND/ÂΜL (ref 0–0.61)
EOSINOPHIL NFR BLD AUTO: 1 % (ref 0–6)
ERYTHROCYTE [DISTWIDTH] IN BLOOD BY AUTOMATED COUNT: 11.8 % (ref 11.6–15.1)
EST. AVERAGE GLUCOSE BLD GHB EST-MCNC: 126 MG/DL
GFR SERPL CREATININE-BSD FRML MDRD: 97 ML/MIN/1.73SQ M
GLUCOSE P FAST SERPL-MCNC: 129 MG/DL (ref 65–99)
HBA1C MFR BLD: 6 %
HCT VFR BLD AUTO: 40.9 % (ref 34.8–46.1)
HDLC SERPL-MCNC: 76 MG/DL
HGB BLD-MCNC: 13.6 G/DL (ref 11.5–15.4)
IMM GRANULOCYTES # BLD AUTO: 0.04 THOUSAND/UL (ref 0–0.2)
IMM GRANULOCYTES NFR BLD AUTO: 1 % (ref 0–2)
LDLC SERPL CALC-MCNC: 165 MG/DL (ref 0–100)
LYMPHOCYTES # BLD AUTO: 2.03 THOUSANDS/ÂΜL (ref 0.6–4.47)
LYMPHOCYTES NFR BLD AUTO: 27 % (ref 14–44)
MCH RBC QN AUTO: 34.3 PG (ref 26.8–34.3)
MCHC RBC AUTO-ENTMCNC: 33.3 G/DL (ref 31.4–37.4)
MCV RBC AUTO: 103 FL (ref 82–98)
MONOCYTES # BLD AUTO: 0.9 THOUSAND/ÂΜL (ref 0.17–1.22)
MONOCYTES NFR BLD AUTO: 12 % (ref 4–12)
NEUTROPHILS # BLD AUTO: 4.37 THOUSANDS/ÂΜL (ref 1.85–7.62)
NEUTS SEG NFR BLD AUTO: 58 % (ref 43–75)
NRBC BLD AUTO-RTO: 0 /100 WBCS
PLATELET # BLD AUTO: 281 THOUSANDS/UL (ref 149–390)
PMV BLD AUTO: 9.7 FL (ref 8.9–12.7)
POTASSIUM SERPL-SCNC: 4.1 MMOL/L (ref 3.5–5.3)
PROT SERPL-MCNC: 7.5 G/DL (ref 6.4–8.4)
RBC # BLD AUTO: 3.97 MILLION/UL (ref 3.81–5.12)
SODIUM SERPL-SCNC: 136 MMOL/L (ref 135–147)
TRIGL SERPL-MCNC: 117 MG/DL
TSH SERPL DL<=0.05 MIU/L-ACNC: 0.84 UIU/ML (ref 0.45–4.5)
WBC # BLD AUTO: 7.46 THOUSAND/UL (ref 4.31–10.16)

## 2024-03-01 PROCEDURE — 80053 COMPREHEN METABOLIC PANEL: CPT

## 2024-03-01 PROCEDURE — 83036 HEMOGLOBIN GLYCOSYLATED A1C: CPT

## 2024-03-01 PROCEDURE — 84443 ASSAY THYROID STIM HORMONE: CPT

## 2024-03-01 PROCEDURE — 36415 COLL VENOUS BLD VENIPUNCTURE: CPT

## 2024-03-01 PROCEDURE — 80061 LIPID PANEL: CPT

## 2024-03-01 PROCEDURE — 85025 COMPLETE CBC W/AUTO DIFF WBC: CPT

## 2024-03-04 ENCOUNTER — OFFICE VISIT (OUTPATIENT)
Age: 54
End: 2024-03-04
Payer: COMMERCIAL

## 2024-03-04 VITALS
HEART RATE: 80 BPM | WEIGHT: 166.01 LBS | OXYGEN SATURATION: 98 % | SYSTOLIC BLOOD PRESSURE: 130 MMHG | HEIGHT: 63 IN | DIASTOLIC BLOOD PRESSURE: 78 MMHG | BODY MASS INDEX: 29.41 KG/M2 | RESPIRATION RATE: 18 BRPM

## 2024-03-04 DIAGNOSIS — E03.9 HYPOTHYROIDISM, UNSPECIFIED TYPE: ICD-10-CM

## 2024-03-04 DIAGNOSIS — E78.2 MIXED HYPERLIPIDEMIA: ICD-10-CM

## 2024-03-04 DIAGNOSIS — R73.03 PREDIABETES: ICD-10-CM

## 2024-03-04 DIAGNOSIS — Z00.00 ANNUAL PHYSICAL EXAM: Primary | ICD-10-CM

## 2024-03-04 DIAGNOSIS — I10 PRIMARY HYPERTENSION: ICD-10-CM

## 2024-03-04 DIAGNOSIS — R74.8 ELEVATED LIVER ENZYMES: ICD-10-CM

## 2024-03-04 PROBLEM — E87.1: Status: RESOLVED | Noted: 2023-08-01 | Resolved: 2024-03-04

## 2024-03-04 PROBLEM — Z12.4 PAP SMEAR FOR CERVICAL CANCER SCREENING: Status: RESOLVED | Noted: 2023-02-27 | Resolved: 2024-03-04

## 2024-03-04 PROBLEM — R22.42 MASS OF LEFT HIP REGION: Status: RESOLVED | Noted: 2023-03-23 | Resolved: 2024-03-04

## 2024-03-04 PROCEDURE — 99396 PREV VISIT EST AGE 40-64: CPT | Performed by: NURSE PRACTITIONER

## 2024-03-04 NOTE — ASSESSMENT & PLAN NOTE
"Patient states \"my GI said its from fatty liver disease\"  Continue GI follow-ups and low fat diet  Aerobic exercise and weight loss also encouraged     "

## 2024-03-04 NOTE — ASSESSMENT & PLAN NOTE
Recent lipid panel relatively unchanged.  ASCVD risk score continues to be in the low category.  Encouraged continued weight loss and aerobic exercise.

## 2024-03-04 NOTE — PROGRESS NOTES
"ADULT ANNUAL PHYSICAL  [facility] PRIMARY CARE    NAME: Jazzmine Hand  AGE: 53 y.o. SEX: female  : 1970     DATE: 3/4/2024     Assessment and Plan:     Care gap request sent for colon cancer screening    F/U in  for BP recheck and POC A1c    Documentation for this encounter was completed with the assistance of dictation software.  Please excuse any grammatical errors.  Please contact the provider if any documentation clarification is necessary.      Problem List Items Addressed This Visit        Endocrine    Hypothyroidism     TSH WNL, continue synthroid 100mcg             Cardiovascular and Mediastinum    Primary hypertension     Blood pressure at goal today  Continue lisinopril 10 mg daily and Cardizem 360 mg daily            Other    Prediabetes     A1c up to 6.0%  Encouraged low-carb diet and increased aerobic exercise  Will recheck point-of-care A1c at follow-up         Mixed hyperlipidemia     Recent lipid panel relatively unchanged.  ASCVD risk score continues to be in the low category.  Encouraged continued weight loss and aerobic exercise.         Elevated liver enzymes     Patient states \"my GI said its from fatty liver disease\"  Continue GI follow-ups and low fat diet  Aerobic exercise and weight loss also encouraged           Other Visit Diagnoses     Annual physical exam    -  Primary          Immunizations and preventive care screenings were discussed with patient today. Appropriate education was printed on patient's after visit summary.    Counseling:  Exercise: the importance of regular exercise/physical activity was discussed. Recommend exercise 3-5 times per week for at least 30 minutes.       Depression Screening and Follow-up Plan: Patient was screened for depression during today's encounter. They screened negative with a PHQ-2 score of 0.        Return in about 7 months (around 10/4/2024) for Recheck, pocA1c.     Chief Complaint: "     Chief Complaint   Patient presents with   • Physical Exam   • Review Lab Results   • Hypertension      History of Present Illness:     Adult Annual Physical   Patient here for a comprehensive physical exam. The patient reports no problems.    Diet and Physical Activity  Diet/Nutrition: heart healthy (low sodium) diet.   Exercise: walking.      Depression Screening  PHQ-2/9 Depression Screening    Little interest or pleasure in doing things: 0 - not at all  Feeling down, depressed, or hopeless: 0 - not at all  PHQ-2 Score: 0  PHQ-2 Interpretation: Negative depression screen       General Health  Sleep: sleeps well.   Hearing: normal - bilateral.  Vision: no vision problems.   Dental: regular dental visits.       /GYN Health  Follows with gynecology? yes     Advanced Care Planning  Do you have an advanced directive? no  Do you have a durable medical power of ? no  ACP document given to the patient? yes     Review of Systems:     Review of Systems   Constitutional: Negative.    HENT: Negative.     Eyes: Negative.    Respiratory: Negative.     Cardiovascular: Negative.    Gastrointestinal: Negative.    Endocrine: Negative.    Genitourinary: Negative.    Musculoskeletal: Negative.    Skin: Negative.    Allergic/Immunologic: Negative.    Neurological: Negative.    Hematological: Negative.    Psychiatric/Behavioral: Negative.        Past Medical History:     Past Medical History:   Diagnosis Date   • Allergic    • Disease of thyroid gland    • GERD (gastroesophageal reflux disease)    • Hypertension    • Inflammatory bowel disease    • Mass of left hip region    • Serum sodium decreased       Past Surgical History:     Past Surgical History:   Procedure Laterality Date   • HYSTERECTOMY     • LIPOMA RESECTION  2023    left hip   • THYROIDECTOMY, PARTIAL      thyroid mass, non cancerous      Social History:     Social History     Socioeconomic History   • Marital status: /Civil Union     Spouse name:  None   • Number of children: None   • Years of education: None   • Highest education level: None   Occupational History   • None   Tobacco Use   • Smoking status: Never   • Smokeless tobacco: Never   Vaping Use   • Vaping status: Never Used   Substance and Sexual Activity   • Alcohol use: Yes     Alcohol/week: 14.0 standard drinks of alcohol     Types: 14 Cans of beer per week     Comment: 2 drinks daily   • Drug use: Never   • Sexual activity: Yes     Partners: Male     Birth control/protection: Female Sterilization   Other Topics Concern   • None   Social History Narrative   • None     Social Determinants of Health     Financial Resource Strain: Not on file   Food Insecurity: Not on file   Transportation Needs: Not on file   Physical Activity: Not on file   Stress: Not on file   Social Connections: Not on file   Intimate Partner Violence: Not on file   Housing Stability: Not on file      Family History:     Family History   Problem Relation Age of Onset   • Prostate cancer Father    • Diabetes Paternal Aunt    • Thyroid disease Sister       Current Medications:     Current Outpatient Medications   Medication Sig Dispense Refill   • Calcium-Magnesium-Vitamin D (CITRACAL CALCIUM+D PO) Take by mouth     • diltiazem (CARDIZEM CD) 360 MG 24 hr capsule TAKE 1 CAPSULE DAILY 90 capsule 3   • famotidine (PEPCID) 20 mg tablet      • lansoprazole (PREVACID) 30 mg capsule Take 30 mg by mouth daily     • lisinopril (ZESTRIL) 10 mg tablet Take 1 tablet (10 mg total) by mouth daily 90 tablet 1   • Magnesium 100 MG CAPS Take 1 capsule by mouth daily at bedtime as needed (sleep)     • Multiple Vitamins-Minerals (WOMENS MULTI PO) 1 capsule daily     • Multiple Vitamins-Minerals (ZINC PO) Take by mouth     • polyethylene glycol (GLYCOLAX) 17 GM/SCOOP powder TAKE 17 GRAMS (1 CAPFUL) DISSOLVED IN WATER ONCE DAILY     • QUERCETIN PO Take by mouth     • Synthroid 100 MCG tablet Take 1 tablet (100 mcg total) by mouth daily Take in the  "morning 30 mins before breakfast 90 tablet 1     No current facility-administered medications for this visit.      Allergies:     Allergies   Allergen Reactions   • Other Rash     Flax seed      Physical Exam:     /78 (BP Location: Right arm, Patient Position: Sitting, Cuff Size: Large)   Pulse 80   Resp 18   Ht 5' 2.5\" (1.588 m)   Wt 75.3 kg (166 lb 0.1 oz)   SpO2 98%   BMI 29.88 kg/m²     Physical Exam  Vitals and nursing note reviewed.   Constitutional:       Appearance: Normal appearance.   HENT:      Head: Normocephalic.      Right Ear: Tympanic membrane normal.      Left Ear: Tympanic membrane normal.      Nose: Nose normal. No congestion.      Mouth/Throat:      Mouth: Mucous membranes are moist.      Pharynx: Oropharynx is clear. No oropharyngeal exudate.   Eyes:      Extraocular Movements: Extraocular movements intact.      Conjunctiva/sclera: Conjunctivae normal.      Pupils: Pupils are equal, round, and reactive to light.   Cardiovascular:      Rate and Rhythm: Normal rate and regular rhythm.      Pulses: Normal pulses.      Heart sounds: Normal heart sounds. No murmur heard.  Pulmonary:      Effort: Pulmonary effort is normal. No respiratory distress.      Breath sounds: Normal breath sounds. No wheezing.   Abdominal:      General: Bowel sounds are normal.      Palpations: Abdomen is soft.      Tenderness: There is no abdominal tenderness.   Musculoskeletal:         General: No swelling, tenderness, deformity or signs of injury. Normal range of motion.      Cervical back: Normal range of motion and neck supple. No tenderness.      Right lower leg: No edema.      Left lower leg: No edema.   Lymphadenopathy:      Cervical: No cervical adenopathy.   Skin:     General: Skin is warm and dry.      Capillary Refill: Capillary refill takes less than 2 seconds.      Findings: No rash.   Neurological:      General: No focal deficit present.      Mental Status: She is alert and oriented to person, " place, and time.      Cranial Nerves: No cranial nerve deficit.      Sensory: No sensory deficit.      Motor: No weakness.      Coordination: Coordination normal.      Gait: Gait normal.      Deep Tendon Reflexes: Reflexes normal.   Psychiatric:         Mood and Affect: Mood normal.         Behavior: Behavior normal.          ARNOLD Quarles  [facility]

## 2024-03-04 NOTE — ASSESSMENT & PLAN NOTE
A1c up to 6.0%  Encouraged low-carb diet and increased aerobic exercise  Will recheck point-of-care A1c at follow-up

## 2024-03-05 ENCOUNTER — TELEPHONE (OUTPATIENT)
Dept: ADMINISTRATIVE | Facility: OTHER | Age: 54
End: 2024-03-05

## 2024-03-05 NOTE — TELEPHONE ENCOUNTER
----- Message from ARNOLD Quarles sent at 3/4/2024 10:45 AM EST -----  Regarding: Care Gap Request  03/04/24 10:45 AM    Hello, our patient attached above has had CRC: Colonoscopy completed/performed. Please assist in updating the patient chart by making an External outreach to  [facility] facility located in [place], [place]. The date of service is 2023.    Thank you,  ARNOLD Quarles  [facility] PRIMARY CARE

## 2024-03-05 NOTE — TELEPHONE ENCOUNTER
Upon review of the In Basket request and the patient's chart, initial outreach has been made via fax to facility. Please see Contacts section for details.     Thank you  Hermelinda Aguila

## 2024-03-09 DIAGNOSIS — K21.9 GASTROESOPHAGEAL REFLUX DISEASE WITHOUT ESOPHAGITIS: Primary | ICD-10-CM

## 2024-03-11 RX ORDER — FAMOTIDINE 20 MG/1
20 TABLET, FILM COATED ORAL DAILY
Qty: 90 TABLET | Refills: 3 | Status: SHIPPED | OUTPATIENT
Start: 2024-03-11

## 2024-03-14 NOTE — TELEPHONE ENCOUNTER
Upon review of the In Basket request we were able to locate, review, and update the patient chart as requested for CRC: Colonoscopy.    Any additional questions or concerns should be emailed to the Practice Liaisons via the appropriate education email address, please do not reply via In Basket.    Thank you  Hermelinda Aguila

## 2024-03-19 ENCOUNTER — ANNUAL EXAM (OUTPATIENT)
Age: 54
End: 2024-03-19
Payer: COMMERCIAL

## 2024-03-19 VITALS
HEART RATE: 87 BPM | SYSTOLIC BLOOD PRESSURE: 122 MMHG | BODY MASS INDEX: 29.52 KG/M2 | HEIGHT: 63 IN | WEIGHT: 166.6 LBS | OXYGEN SATURATION: 98 % | DIASTOLIC BLOOD PRESSURE: 72 MMHG

## 2024-03-19 DIAGNOSIS — Z12.31 VISIT FOR SCREENING MAMMOGRAM: ICD-10-CM

## 2024-03-19 DIAGNOSIS — Z01.419 WELL FEMALE EXAM WITH ROUTINE GYNECOLOGICAL EXAM: Primary | ICD-10-CM

## 2024-03-19 PROCEDURE — S0612 ANNUAL GYNECOLOGICAL EXAMINA: HCPCS | Performed by: PHYSICIAN ASSISTANT

## 2024-03-19 NOTE — PROGRESS NOTES
Patient here for ROUTINE GYN EXAM.    Patient is 53 y.o. year old female G 0 P 0.  She is here today for her routine gyn exam. The patient is s/p  MARIANA/ BSO for endometriosis/ fibroids with Dr. Rivera.    Pt does not smoke.  She denies alcohol/drug abuse.  She denies domestic violence/abuse.  She declines STD/HIV testing.  She denies problems with her breasts, bladder, or bowel.      We discussed the current ACOG pap guidelines recommendation for no pap smears after hysterectomy.  Pt elects no pap today.      Domestic violence screen: negative    She has no complaints today.     The patients medical, surgical, and family history was reviewed and updated.     Last mammogram: 8/24/23    Colonscopy: 10/2023-  digestive [facility]      Family History breast cancer:  no  Family History ovarian cancer: no  Family History colon cancer: no      Review of Systems   Constitutional: Negative.    HENT: Negative.    Eyes: Negative.    Respiratory: Negative.    Cardiovascular: Negative.    Gastrointestinal: Negative.    Endocrine: Negative.    Genitourinary:        As noted in HPI   Musculoskeletal: Negative.    Skin: Negative.    Allergic/Immunologic: Negative.    Neurological: Negative.    Hematological: Negative.    Psychiatric/Behavioral: Negative    Current Outpatient Medications on File Prior to Visit   Medication Sig Dispense Refill    Calcium-Magnesium-Vitamin D (CITRACAL CALCIUM+D PO) Take by mouth      diltiazem (CARDIZEM CD) 360 MG 24 hr capsule TAKE 1 CAPSULE DAILY 90 capsule 3    famotidine (PEPCID) 20 mg tablet Take 1 tablet (20 mg total) by mouth daily 90 tablet 3    lansoprazole (PREVACID) 30 mg capsule Take 30 mg by mouth daily      lisinopril (ZESTRIL) 10 mg tablet Take 1 tablet (10 mg total) by mouth daily 90 tablet 1    Magnesium 100 MG CAPS Take 1 capsule by mouth daily at bedtime as needed (sleep)      Multiple Vitamins-Minerals (WOMENS MULTI PO) 1 capsule daily      Multiple Vitamins-Minerals (ZINC PO) Take  "by mouth      polyethylene glycol (GLYCOLAX) 17 GM/SCOOP powder TAKE 17 GRAMS (1 CAPFUL) DISSOLVED IN WATER ONCE DAILY      QUERCETIN PO Take by mouth      Synthroid 100 MCG tablet Take 1 tablet (100 mcg total) by mouth daily Take in the morning 30 mins before breakfast 90 tablet 1     No current facility-administered medications on file prior to visit.       Allergies   Allergen Reactions    Other Rash     Flax seed       Past Surgical History:   Procedure Laterality Date    HYSTERECTOMY      LIPOMA RESECTION  2023    left hip    THYROIDECTOMY, PARTIAL      thyroid mass, non cancerous         Vitals:    03/19/24 0944   BP: 122/72   Pulse: 87   SpO2: 98%   Weight: 75.6 kg (166 lb 9.6 oz)   Height: 5' 2.5\" (1.588 m)           EXAM:    Neck: supple without nodes or thyromegaly  Heart: regular rate and rhythm  Lungs: clear to auscultation without rales, rhonci  Breasts: nontender, no masses, no discoloration, no discharge  Abdomen: soft, nontender, no masses  Ext genitalia: no lesions, no discoloration  Urethra: no discharge or erythema  Bladder: nontender, good support  Vagina: no discharge, no lesions  Cervix: surgically removed  Adnexa: nontender, no masses  Uterus: surgically removed    Assessment:  Routine GYN exam    Plan:  no pap today  Mammogram ordered  Colonoscopy up to date:  patient to send copy of results  Recommend healthy diet and regular exercise.      "

## 2024-04-02 ENCOUNTER — TELEPHONE (OUTPATIENT)
Dept: ADMINISTRATIVE | Facility: OTHER | Age: 54
End: 2024-04-02

## 2024-04-02 NOTE — TELEPHONE ENCOUNTER
----- Message from Bobby Meeks PA-C sent at 4/1/2024  7:00 PM EDT -----  04/01/24 7:01 PM    Clive, our patient Jazzmine Hand has had CRC: Colonoscopy completed/performed. Please assist in updating the patient chart by pulling the document from the Media Tab. The date of service is 10/2023.     Thank you,  Bobby Meeks PA-C  [facility]

## 2024-04-02 NOTE — TELEPHONE ENCOUNTER
Upon review of the In Basket request we were able to locate, review, and update the patient chart as requested for CRC: Colonoscopy.    Any additional questions or concerns should be emailed to the Practice Liaisons via the appropriate education email address, please do not reply via In Basket.    Thank you  Nely Cheatham

## 2024-05-04 DIAGNOSIS — I10 PRIMARY HYPERTENSION: ICD-10-CM

## 2024-05-05 RX ORDER — LISINOPRIL 10 MG/1
10 TABLET ORAL DAILY
Qty: 90 TABLET | Refills: 1 | Status: SHIPPED | OUTPATIENT
Start: 2024-05-05

## 2024-05-21 ENCOUNTER — TELEPHONE (OUTPATIENT)
Age: 54
End: 2024-05-21

## 2024-05-21 NOTE — TELEPHONE ENCOUNTER
Patient returned call regarding rescheduling appointment. She stated that she does not wish to reschedule appointment at this time; she will wait until new provider comes in.

## 2024-05-21 NOTE — TELEPHONE ENCOUNTER
Tried calling patient regarding cancelled appointment 10/21/24, phone disconnected and unable to leave . Sent the patient a [employer] message

## 2024-05-25 DIAGNOSIS — E03.9 HYPOTHYROIDISM, UNSPECIFIED TYPE: ICD-10-CM

## 2024-05-27 RX ORDER — LEVOTHYROXINE SODIUM 0.1 MG/1
TABLET ORAL
Qty: 90 TABLET | Refills: 1 | Status: SHIPPED | OUTPATIENT
Start: 2024-05-27

## 2024-08-26 ENCOUNTER — TELEPHONE (OUTPATIENT)
Age: 54
End: 2024-08-26

## 2024-08-26 NOTE — TELEPHONE ENCOUNTER
Tika called in stating that the patient is scheduled for a mammogram in the morning and the script will need to be sent over, please fax mammogram screening script to  Fax: [phone]

## 2024-11-04 ENCOUNTER — TRANSCRIBE ORDERS (OUTPATIENT)
Age: 54
End: 2024-11-04

## 2024-11-04 DIAGNOSIS — E03.9 HYPOTHYROIDISM, UNSPECIFIED TYPE: Primary | ICD-10-CM

## 2024-11-04 DIAGNOSIS — I10 PRIMARY HYPERTENSION: ICD-10-CM

## 2024-11-04 DIAGNOSIS — E03.9 HYPOTHYROIDISM, UNSPECIFIED TYPE: ICD-10-CM

## 2024-11-06 RX ORDER — DILTIAZEM HYDROCHLORIDE 360 MG/1
360 CAPSULE, EXTENDED RELEASE ORAL DAILY
Qty: 60 CAPSULE | Refills: 0 | Status: SHIPPED | OUTPATIENT
Start: 2024-11-06

## 2024-11-06 RX ORDER — LISINOPRIL 10 MG/1
10 TABLET ORAL DAILY
Qty: 60 TABLET | Refills: 0 | Status: SHIPPED | OUTPATIENT
Start: 2024-11-06 | End: 2025-01-05

## 2024-11-06 RX ORDER — LEVOTHYROXINE SODIUM 100 UG/1
100 TABLET ORAL DAILY
Qty: 60 TABLET | Refills: 0 | Status: SHIPPED | OUTPATIENT
Start: 2024-11-06

## 2024-11-15 DIAGNOSIS — I10 PRIMARY HYPERTENSION: ICD-10-CM

## 2024-11-18 RX ORDER — DILTIAZEM HYDROCHLORIDE 360 MG/1
360 CAPSULE, EXTENDED RELEASE ORAL DAILY
Qty: 60 CAPSULE | Refills: 0 | Status: SHIPPED | OUTPATIENT
Start: 2024-11-18

## 2024-12-03 ENCOUNTER — APPOINTMENT (OUTPATIENT)
Age: 54
End: 2024-12-03
Payer: COMMERCIAL

## 2024-12-03 DIAGNOSIS — E03.9 HYPOTHYROIDISM, UNSPECIFIED TYPE: ICD-10-CM

## 2024-12-03 LAB — TSH SERPL DL<=0.05 MIU/L-ACNC: 1 UIU/ML (ref 0.45–4.5)

## 2024-12-03 PROCEDURE — 84443 ASSAY THYROID STIM HORMONE: CPT

## 2024-12-03 PROCEDURE — 36415 COLL VENOUS BLD VENIPUNCTURE: CPT

## 2024-12-04 ENCOUNTER — RESULTS FOLLOW-UP (OUTPATIENT)
Dept: FAMILY MEDICINE CLINIC | Facility: CLINIC | Age: 54
End: 2024-12-04

## 2024-12-23 ENCOUNTER — OFFICE VISIT (OUTPATIENT)
Age: 54
End: 2024-12-23
Payer: COMMERCIAL

## 2024-12-23 VITALS
HEART RATE: 94 BPM | OXYGEN SATURATION: 98 % | TEMPERATURE: 98.3 F | HEIGHT: 63 IN | SYSTOLIC BLOOD PRESSURE: 136 MMHG | DIASTOLIC BLOOD PRESSURE: 78 MMHG | BODY MASS INDEX: 28.63 KG/M2 | RESPIRATION RATE: 20 BRPM | WEIGHT: 161.6 LBS

## 2024-12-23 DIAGNOSIS — E78.2 MIXED HYPERLIPIDEMIA: ICD-10-CM

## 2024-12-23 DIAGNOSIS — Z13.0 SCREENING FOR DEFICIENCY ANEMIA: ICD-10-CM

## 2024-12-23 DIAGNOSIS — R74.8 ELEVATED LIVER ENZYMES: ICD-10-CM

## 2024-12-23 DIAGNOSIS — E03.9 HYPOTHYROIDISM, UNSPECIFIED TYPE: Primary | ICD-10-CM

## 2024-12-23 DIAGNOSIS — I10 PRIMARY HYPERTENSION: ICD-10-CM

## 2024-12-23 DIAGNOSIS — R01.1 HEART MURMUR: ICD-10-CM

## 2024-12-23 DIAGNOSIS — R73.03 PREDIABETES: ICD-10-CM

## 2024-12-23 PROCEDURE — 99214 OFFICE O/P EST MOD 30 MIN: CPT

## 2024-12-23 NOTE — ASSESSMENT & PLAN NOTE
Pt has a history of hypertension; controlled on diltiazem, and lisinopril     BP In office 136/78; pt reports that she takes her BP at home and has always had normal readings;     Denies headaches, blurred vision or headaches;

## 2024-12-23 NOTE — PATIENT INSTRUCTIONS
Labs are walk in, patients do not need appointments or lab slips. Blood work is fasting (no food) 8-10 hours before draw. Please no food or liquid other than water and black coffee. Lab results will be sent to provider, provider will reach out regarding results via My Chart messaging or phone call.

## 2024-12-23 NOTE — ASSESSMENT & PLAN NOTE
Last lipid panel was abnormal;  LDL-165   HDL- 76    Last lab work reviewed; repeat labs ordered;     Patient decline medication management at this time; educated on healthy eating and exercise;  Orders:    Lipid panel; Future

## 2024-12-23 NOTE — PROGRESS NOTES
Name: Jazzmine Hand      : 1970      MRN: [id number]  Encounter Provider: Kelli Paz  Encounter Date: 2024   Encounter department: [facility]  :  Assessment & Plan  Hypothyroidism, unspecified type  TSH normal; stable on 100 mcg of Levothyroxine;     Denies any complaints or concerns at this time;     Heart murmur  Provider heard soft murmur on exam; denies any shortness of breath, chest pain or edema;     Patient verbalized understanding of going to cardiology   Orders:    Ambulatory Referral to Cardiology; Future    Prediabetes  Last A1c was 6.0; repeat labs ordered   Orders:    Hemoglobin A1C; Future    Mixed hyperlipidemia  Last lipid panel was abnormal;  LDL-165   HDL- 76    Last lab work reviewed; repeat labs ordered;     Patient decline medication management at this time; educated on healthy eating and exercise;  Orders:    Lipid panel; Future    Primary hypertension  Pt has a history of hypertension; controlled on diltiazem, and lisinopril     BP In office 136/78; pt reports that she takes her BP at home and has always had normal readings;     Denies headaches, blurred vision or headaches;    Elevated liver enzymes  Patient has had elevated liver enzymes in the past; follows with digestive health; repeat hepatic function panel ordered'   Orders:    Hepatic function panel; Future    Screening for deficiency anemia    Orders:    Comprehensive metabolic panel; Future    CBC and differential; Future           History of Present Illness     HPI  Jazzmine Hand is a 54 year old female seen in the office today for follow up for hypothyroidism;     Baseline labs ordered;   Referral to cardiology for murmur  F/u in 3 months for annual physical       Review of Systems   Constitutional:  Negative for chills and fever.   HENT:  Negative for ear pain and sore throat.    Eyes:  Negative for pain and visual disturbance.   Respiratory:  Negative for cough and shortness of  "breath.    Cardiovascular:  Negative for chest pain and palpitations.   Gastrointestinal:  Negative for abdominal pain and vomiting.   Genitourinary:  Negative for dysuria and hematuria.   Musculoskeletal:  Negative for arthralgias and back pain.   Skin:  Negative for color change and rash.   Neurological:  Negative for dizziness, seizures, syncope and headaches.   Psychiatric/Behavioral:  Negative for confusion.    All other systems reviewed and are negative.      Objective   /82 (BP Location: Left arm, Patient Position: Sitting, Cuff Size: Standard)   Pulse 94   Temp 98.3 °F (36.8 °C) (Tympanic)   Resp 20   Ht 5' 2.5\" (1.588 m)   Wt 73.3 kg (161 lb 9.6 oz)   SpO2 98%   BMI 29.09 kg/m²      Physical Exam  Vitals and nursing note reviewed.   Constitutional:       General: She is not in acute distress.     Appearance: Normal appearance. She is well-developed. She is not ill-appearing, toxic-appearing or diaphoretic.   HENT:      Head: Normocephalic and atraumatic.   Eyes:      Conjunctiva/sclera: Conjunctivae normal.   Cardiovascular:      Rate and Rhythm: Normal rate and regular rhythm.      Pulses: Normal pulses.      Heart sounds: Murmur heard.   Pulmonary:      Effort: Pulmonary effort is normal. No respiratory distress.      Breath sounds: Normal breath sounds.   Musculoskeletal:         General: No swelling.      Cervical back: Neck supple.   Skin:     General: Skin is warm and dry.      Capillary Refill: Capillary refill takes less than 2 seconds.   Neurological:      General: No focal deficit present.      Mental Status: She is alert and oriented to person, place, and time. Mental status is at baseline.   Psychiatric:         Mood and Affect: Mood normal.         Behavior: Behavior normal.         Thought Content: Thought content normal.         Judgment: Judgment normal.         "

## 2024-12-23 NOTE — ASSESSMENT & PLAN NOTE
Patient has had elevated liver enzymes in the past; follows with digestive health; repeat hepatic function panel ordered'   Orders:    Hepatic function panel; Future

## 2024-12-23 NOTE — ASSESSMENT & PLAN NOTE
Provider heard soft murmur on exam; denies any shortness of breath, chest pain or edema;     Patient verbalized understanding of going to cardiology   Orders:    Ambulatory Referral to Cardiology; Future

## 2024-12-23 NOTE — ASSESSMENT & PLAN NOTE
TSH normal; stable on 100 mcg of Levothyroxine;     Denies any complaints or concerns at this time;

## 2024-12-24 DIAGNOSIS — R01.1 HEART MURMUR: Primary | ICD-10-CM

## 2024-12-28 DIAGNOSIS — E03.9 HYPOTHYROIDISM, UNSPECIFIED TYPE: ICD-10-CM

## 2024-12-30 RX ORDER — LEVOTHYROXINE SODIUM 100 UG/1
100 TABLET ORAL DAILY
Qty: 90 TABLET | Refills: 1 | Status: SHIPPED | OUTPATIENT
Start: 2024-12-30

## 2025-01-04 DIAGNOSIS — I10 PRIMARY HYPERTENSION: ICD-10-CM

## 2025-01-05 RX ORDER — DILTIAZEM HYDROCHLORIDE 360 MG/1
360 CAPSULE, EXTENDED RELEASE ORAL DAILY
Qty: 60 CAPSULE | Refills: 5 | Status: SHIPPED | OUTPATIENT
Start: 2025-01-05

## 2025-01-17 ENCOUNTER — APPOINTMENT (OUTPATIENT)
Age: 55
End: 2025-01-17
Payer: COMMERCIAL

## 2025-01-17 ENCOUNTER — PATIENT MESSAGE (OUTPATIENT)
Age: 55
End: 2025-01-17

## 2025-01-17 DIAGNOSIS — Z13.0 SCREENING FOR DEFICIENCY ANEMIA: ICD-10-CM

## 2025-01-17 DIAGNOSIS — E78.2 MIXED HYPERLIPIDEMIA: ICD-10-CM

## 2025-01-17 DIAGNOSIS — R74.8 ELEVATED LIVER ENZYMES: ICD-10-CM

## 2025-01-17 DIAGNOSIS — R73.03 PREDIABETES: ICD-10-CM

## 2025-01-17 LAB
ALBUMIN SERPL BCG-MCNC: 4.7 G/DL (ref 3.5–5)
ALP SERPL-CCNC: 120 U/L (ref 34–104)
ALT SERPL W P-5'-P-CCNC: 52 U/L (ref 7–52)
ANION GAP SERPL CALCULATED.3IONS-SCNC: 13 MMOL/L (ref 4–13)
AST SERPL W P-5'-P-CCNC: 32 U/L (ref 13–39)
BASOPHILS # BLD AUTO: 0.05 THOUSANDS/ΜL (ref 0–0.1)
BASOPHILS NFR BLD AUTO: 1 % (ref 0–1)
BILIRUB DIRECT SERPL-MCNC: 0.18 MG/DL (ref 0–0.2)
BILIRUB SERPL-MCNC: 0.64 MG/DL (ref 0.2–1)
BUN SERPL-MCNC: 13 MG/DL (ref 5–25)
CALCIUM SERPL-MCNC: 9.9 MG/DL (ref 8.4–10.2)
CHLORIDE SERPL-SCNC: 94 MMOL/L (ref 96–108)
CHOLEST SERPL-MCNC: 262 MG/DL (ref ?–200)
CO2 SERPL-SCNC: 28 MMOL/L (ref 21–32)
CREAT SERPL-MCNC: 0.68 MG/DL (ref 0.6–1.3)
EOSINOPHIL # BLD AUTO: 0.05 THOUSAND/ΜL (ref 0–0.61)
EOSINOPHIL NFR BLD AUTO: 1 % (ref 0–6)
ERYTHROCYTE [DISTWIDTH] IN BLOOD BY AUTOMATED COUNT: 11.7 % (ref 11.6–15.1)
EST. AVERAGE GLUCOSE BLD GHB EST-MCNC: 128 MG/DL
GFR SERPL CREATININE-BSD FRML MDRD: 98 ML/MIN/1.73SQ M
GLUCOSE P FAST SERPL-MCNC: 112 MG/DL (ref 65–99)
HBA1C MFR BLD: 6.1 %
HCT VFR BLD AUTO: 39.4 % (ref 34.8–46.1)
HDLC SERPL-MCNC: 83 MG/DL
HGB BLD-MCNC: 13.5 G/DL (ref 11.5–15.4)
IMM GRANULOCYTES # BLD AUTO: 0.04 THOUSAND/UL (ref 0–0.2)
IMM GRANULOCYTES NFR BLD AUTO: 1 % (ref 0–2)
LDLC SERPL CALC-MCNC: 156 MG/DL (ref 0–100)
LYMPHOCYTES # BLD AUTO: 1.92 THOUSANDS/ΜL (ref 0.6–4.47)
LYMPHOCYTES NFR BLD AUTO: 25 % (ref 14–44)
MCH RBC QN AUTO: 34.2 PG (ref 26.8–34.3)
MCHC RBC AUTO-ENTMCNC: 34.3 G/DL (ref 31.4–37.4)
MCV RBC AUTO: 100 FL (ref 82–98)
MONOCYTES # BLD AUTO: 0.9 THOUSAND/ΜL (ref 0.17–1.22)
MONOCYTES NFR BLD AUTO: 12 % (ref 4–12)
NEUTROPHILS # BLD AUTO: 4.6 THOUSANDS/ΜL (ref 1.85–7.62)
NEUTS SEG NFR BLD AUTO: 60 % (ref 43–75)
NONHDLC SERPL-MCNC: 179 MG/DL
NRBC BLD AUTO-RTO: 0 /100 WBCS
PLATELET # BLD AUTO: 297 THOUSANDS/UL (ref 149–390)
PMV BLD AUTO: 9.4 FL (ref 8.9–12.7)
POTASSIUM SERPL-SCNC: 3.7 MMOL/L (ref 3.5–5.3)
PROT SERPL-MCNC: 7.9 G/DL (ref 6.4–8.4)
RBC # BLD AUTO: 3.95 MILLION/UL (ref 3.81–5.12)
SODIUM SERPL-SCNC: 135 MMOL/L (ref 135–147)
TRIGL SERPL-MCNC: 114 MG/DL (ref ?–150)
WBC # BLD AUTO: 7.56 THOUSAND/UL (ref 4.31–10.16)

## 2025-01-17 PROCEDURE — 80061 LIPID PANEL: CPT

## 2025-01-17 PROCEDURE — 82248 BILIRUBIN DIRECT: CPT

## 2025-01-17 PROCEDURE — 83036 HEMOGLOBIN GLYCOSYLATED A1C: CPT

## 2025-01-17 PROCEDURE — 85025 COMPLETE CBC W/AUTO DIFF WBC: CPT

## 2025-01-17 PROCEDURE — 36415 COLL VENOUS BLD VENIPUNCTURE: CPT

## 2025-01-17 PROCEDURE — 80053 COMPREHEN METABOLIC PANEL: CPT

## 2025-01-20 ENCOUNTER — TELEPHONE (OUTPATIENT)
Dept: OBGYN CLINIC | Facility: CLINIC | Age: 55
End: 2025-01-20

## 2025-01-20 ENCOUNTER — RESULTS FOLLOW-UP (OUTPATIENT)
Dept: OBGYN CLINIC | Facility: CLINIC | Age: 55
End: 2025-01-20

## 2025-01-23 DIAGNOSIS — Z01.89 NEED FOR ASSESSMENT FOR SLEEP APNEA: Primary | ICD-10-CM

## 2025-01-24 ENCOUNTER — OFFICE VISIT (OUTPATIENT)
Age: 55
End: 2025-01-24
Payer: COMMERCIAL

## 2025-01-24 VITALS
BODY MASS INDEX: 27.46 KG/M2 | HEIGHT: 63 IN | HEART RATE: 80 BPM | TEMPERATURE: 97.6 F | RESPIRATION RATE: 18 BRPM | WEIGHT: 154.98 LBS | OXYGEN SATURATION: 98 %

## 2025-01-24 DIAGNOSIS — E78.2 MIXED HYPERLIPIDEMIA: Primary | ICD-10-CM

## 2025-01-24 DIAGNOSIS — R74.8 ELEVATED LIVER ENZYMES: ICD-10-CM

## 2025-01-24 DIAGNOSIS — K21.9 GASTROESOPHAGEAL REFLUX DISEASE WITHOUT ESOPHAGITIS: ICD-10-CM

## 2025-01-24 DIAGNOSIS — R01.1 HEART MURMUR: ICD-10-CM

## 2025-01-24 DIAGNOSIS — E03.9 HYPOTHYROIDISM, UNSPECIFIED TYPE: ICD-10-CM

## 2025-01-24 PROCEDURE — 99214 OFFICE O/P EST MOD 30 MIN: CPT

## 2025-01-24 RX ORDER — FAMOTIDINE 20 MG/1
20 TABLET, FILM COATED ORAL 2 TIMES DAILY
Qty: 90 TABLET | Refills: 3 | Status: SHIPPED | OUTPATIENT
Start: 2025-01-24

## 2025-01-24 RX ORDER — LEVOTHYROXINE SODIUM 100 UG/1
100 TABLET ORAL DAILY
Qty: 90 TABLET | Refills: 0 | Status: SHIPPED | OUTPATIENT
Start: 2025-01-24

## 2025-01-24 NOTE — ASSESSMENT & PLAN NOTE
Controlled on medication   Orders:    levothyroxine 100 mcg tablet; Take 1 tablet (100 mcg total) by mouth daily

## 2025-01-24 NOTE — ASSESSMENT & PLAN NOTE
Pt seen in the office today for a follow up and lab results discussion    Recent blood work showed elevated cholesterol; pt declined medications and would like to exercise with other lifestyle modifications    Repeat lab work in 4 months   Orders:    Lipid panel; Future

## 2025-01-24 NOTE — ASSESSMENT & PLAN NOTE
Pt was recently seen by cardiology and was told to have a ECHO and also a sleep study for heart murmur; pt declined a sleep study but states that she got the ECHO done and will sign consent to get results;    Follow up cardiology appointment in 3 weeks;

## 2025-01-24 NOTE — PROGRESS NOTES
Name: Jazzmine Hand      : 1970      MRN: [id number]  Encounter Provider: Kelli Paz  Encounter Date: 2025   Encounter department: [facility]  :  Assessment & Plan  Mixed hyperlipidemia  Pt seen in the office today for a follow up and lab results discussion    Recent blood work showed elevated cholesterol; pt declined medications and would like to exercise with other lifestyle modifications    Repeat lab work in 4 months   Orders:    Lipid panel; Future    Gastroesophageal reflux disease without esophagitis  Symptom controlled with medication   Orders:    famotidine (PEPCID) 20 mg tablet; Take 1 tablet (20 mg total) by mouth 2 (two) times a day    Elevated liver enzymes  Pt has a history of elevated LFTs but states that they are trending in the right direction; pt follows with a digestive specialist;     Repeat LFT's in 6 weeks;     Orders:    Hepatic function panel; Future    Hypothyroidism, unspecified type  Controlled on medication   Orders:    levothyroxine 100 mcg tablet; Take 1 tablet (100 mcg total) by mouth daily    Heart murmur  Pt was recently seen by cardiology and was told to have a ECHO and also a sleep study for heart murmur; pt declined a sleep study but states that she got the ECHO done and will sign consent to get results;    Follow up cardiology appointment in 3 weeks;            History of Present Illness   HPI  Jazzmine Handis a 54 year old female seen in the office today for follow up and to result lab results; elevated cholesterol noted by erick declined medication, ECHO results will be obtain and follow up cardiology appointment for murmur in 3 weeks; declines sleep study     Repeat LFT's in 6 weeks   Repeat lipids in 4 months     F/u in 6 months for physical exam  Review of Systems   Constitutional:  Negative for chills and fever.   HENT:  Negative for ear pain and sore throat.    Eyes:  Negative for pain and visual disturbance.   Respiratory:   "Negative for cough and shortness of breath.    Cardiovascular:  Negative for chest pain and palpitations.   Gastrointestinal:  Negative for abdominal pain and vomiting.   Musculoskeletal:  Negative for arthralgias and back pain.   Skin:  Negative for color change and rash.   Neurological:  Negative for seizures and syncope.   All other systems reviewed and are negative.      Objective   Pulse 80   Temp 97.6 °F (36.4 °C) (Tympanic)   Resp 18   Ht 5' 2.5\" (1.588 m)   Wt 70.3 kg (154 lb 15.7 oz)   SpO2 98%   BMI 27.90 kg/m²      Physical Exam  Vitals and nursing note reviewed.   Constitutional:       General: She is not in acute distress.     Appearance: She is well-developed.   HENT:      Head: Normocephalic and atraumatic.   Eyes:      Conjunctiva/sclera: Conjunctivae normal.   Cardiovascular:      Rate and Rhythm: Normal rate and regular rhythm.      Pulses: Normal pulses.      Heart sounds: Murmur heard.   Pulmonary:      Effort: Pulmonary effort is normal. No respiratory distress.      Breath sounds: Normal breath sounds.   Abdominal:      Palpations: Abdomen is soft.      Tenderness: There is no abdominal tenderness.   Musculoskeletal:         General: No swelling.      Cervical back: Neck supple.   Skin:     General: Skin is warm and dry.      Capillary Refill: Capillary refill takes less than 2 seconds.   Neurological:      Mental Status: She is alert. Mental status is at baseline.   Psychiatric:         Mood and Affect: Mood normal.         "

## 2025-01-24 NOTE — ASSESSMENT & PLAN NOTE
Pt has a history of elevated LFTs but states that they are trending in the right direction; pt follows with a digestive specialist;     Repeat LFT's in 6 weeks;     Orders:    Hepatic function panel; Future

## 2025-01-24 NOTE — TELEPHONE ENCOUNTER
----- Message from Kelli Paz sent at 1/23/2025  6:44 PM EST -----  Please call patient and see if she can come in to talk about recent lab work; also I read cardiology note and they recommended a sleep study so I placed the order for a sleep referral; thank

## 2025-01-24 NOTE — ASSESSMENT & PLAN NOTE
Symptom controlled with medication   Orders:    famotidine (PEPCID) 20 mg tablet; Take 1 tablet (20 mg total) by mouth 2 (two) times a day

## 2025-02-01 DIAGNOSIS — I10 PRIMARY HYPERTENSION: ICD-10-CM

## 2025-02-03 DIAGNOSIS — K21.9 GASTROESOPHAGEAL REFLUX DISEASE WITHOUT ESOPHAGITIS: ICD-10-CM

## 2025-02-03 RX ORDER — LISINOPRIL 10 MG/1
10 TABLET ORAL DAILY
Qty: 90 TABLET | Refills: 1 | Status: SHIPPED | OUTPATIENT
Start: 2025-02-03 | End: 2025-08-02

## 2025-02-04 RX ORDER — FAMOTIDINE 20 MG/1
20 TABLET, FILM COATED ORAL 2 TIMES DAILY
Qty: 180 TABLET | Refills: 1 | Status: SHIPPED | OUTPATIENT
Start: 2025-02-04

## 2025-03-14 ENCOUNTER — APPOINTMENT (OUTPATIENT)
Age: 55
End: 2025-03-14
Payer: COMMERCIAL

## 2025-03-14 ENCOUNTER — RESULTS FOLLOW-UP (OUTPATIENT)
Age: 55
End: 2025-03-14

## 2025-03-14 DIAGNOSIS — R74.8 ELEVATED LIVER ENZYMES: ICD-10-CM

## 2025-03-14 LAB
ALBUMIN SERPL BCG-MCNC: 4.6 G/DL (ref 3.5–5)
ALP SERPL-CCNC: 119 U/L (ref 34–104)
ALT SERPL W P-5'-P-CCNC: 53 U/L (ref 7–52)
AST SERPL W P-5'-P-CCNC: 37 U/L (ref 13–39)
BILIRUB DIRECT SERPL-MCNC: 0.18 MG/DL (ref 0–0.2)
BILIRUB SERPL-MCNC: 0.66 MG/DL (ref 0.2–1)
PROT SERPL-MCNC: 7.9 G/DL (ref 6.4–8.4)

## 2025-03-14 PROCEDURE — 80076 HEPATIC FUNCTION PANEL: CPT

## 2025-03-14 PROCEDURE — 36415 COLL VENOUS BLD VENIPUNCTURE: CPT

## 2025-03-17 NOTE — TELEPHONE ENCOUNTER
Please review answer to your question sent through my chart.  [facility] (supporting Kelli Paz)  3 days ago  SY  I will be seeing you in July and will be doing the second bloodwork request prior to that. We will discuss at my appt in July

## 2025-03-24 ENCOUNTER — ANNUAL EXAM (OUTPATIENT)
Age: 55
End: 2025-03-24
Payer: COMMERCIAL

## 2025-03-24 VITALS
BODY MASS INDEX: 28.28 KG/M2 | HEIGHT: 63 IN | OXYGEN SATURATION: 98 % | WEIGHT: 159.6 LBS | DIASTOLIC BLOOD PRESSURE: 78 MMHG | SYSTOLIC BLOOD PRESSURE: 128 MMHG | HEART RATE: 89 BPM

## 2025-03-24 DIAGNOSIS — Z01.419 WELL FEMALE EXAM WITH ROUTINE GYNECOLOGICAL EXAM: Primary | ICD-10-CM

## 2025-03-24 DIAGNOSIS — Z90.710 STATUS POST HYSTERECTOMY: ICD-10-CM

## 2025-03-24 DIAGNOSIS — Z12.31 VISIT FOR SCREENING MAMMOGRAM: ICD-10-CM

## 2025-03-24 PROCEDURE — S0612 ANNUAL GYNECOLOGICAL EXAMINA: HCPCS | Performed by: PHYSICIAN ASSISTANT

## 2025-03-24 NOTE — PROGRESS NOTES
Assessment & Plan  Well female exam with routine gynecological exam  Cervical cancer screening: not indicated   STD screening: declines  Breast cancer screening: mammogram ordered  Colon cancer screening: up to date      Status post hysterectomy                 CHIEF COMPLAINT:    Patient here for ROUTINE GYN EXAM.    SUBJECTIVE:    Patient is 54 y.o. year old female G 0 P 0.  She is here today for her routine gyn exam. The patient is s/p  MARIANA/ BSO for endometriosis/ fibroids with Dr. Rivera.   Pt does not smoke.  She denies alcohol/drug abuse.  She denies domestic violence/abuse.  She declines STD/HIV testing.  She denies problems with her breasts, bladder, or bowel.      Hx of cervical cancer or high grade cervical cell changes that would require additional 20 years of pap screening after hysterectomy: no    We discussed the current ACOG pap guidelines recommendation for no pap smears after hysterectomy.  Pt elects no pap today.      Domestic violence screen: negative    She has no complaints today.     The patients medical, surgical, and family history was reviewed and updated.     Last mammogram: 8/27/24    Colon cancer screening: colonoscopy 10/13/24, repeat in 5 years      Family History breast cancer:  no  Family History ovarian cancer: no  Family History colon cancer: no      Review of Systems   Constitutional: Negative.    HENT: Negative.    Eyes: Negative.    Respiratory: Negative.    Cardiovascular: Negative.    Gastrointestinal: Negative.    Endocrine: Negative.    Genitourinary:        As noted in HPI   Musculoskeletal: Negative.    Skin: Negative.    Allergic/Immunologic: Negative.    Neurological: Negative.    Hematological: Negative.    Psychiatric/Behavioral: Negative    Current Outpatient Medications on File Prior to Visit   Medication Sig Dispense Refill    Calcium-Magnesium-Vitamin D (CITRACAL CALCIUM+D PO) Take by mouth      diltiazem (CARDIZEM CD) 360 MG 24 hr capsule TAKE 1 CAPSULE DAILY 60  "capsule 5    famotidine (PEPCID) 20 mg tablet Take 1 tablet (20 mg total) by mouth 2 (two) times a day 180 tablet 1    lansoprazole (PREVACID) 30 mg capsule Take 30 mg by mouth daily      levothyroxine 100 mcg tablet Take 1 tablet (100 mcg total) by mouth daily 90 tablet 0    lisinopril (ZESTRIL) 10 mg tablet Take 1 tablet (10 mg total) by mouth daily 90 tablet 1    polyethylene glycol (GLYCOLAX) 17 GM/SCOOP powder TAKE 17 GRAMS (1 CAPFUL) DISSOLVED IN WATER ONCE DAILY       No current facility-administered medications on file prior to visit.       Allergies   Allergen Reactions    Other Rash     Flax seed       Past Surgical History:   Procedure Laterality Date    HYSTERECTOMY      LIPOMA RESECTION  2023    left hip    THYROIDECTOMY, PARTIAL      thyroid mass, non cancerous       OBJECTIVE:    Vitals:    03/24/25 0758   BP: 128/78   BP Location: Right arm   Patient Position: Sitting   Cuff Size: Standard   Pulse: 89   SpO2: 98%   Weight: 72.4 kg (159 lb 9.6 oz)   Height: 5' 2.5\" (1.588 m)       Chaperone was present during exam.    EXAM:    Neck: supple without nodes or thyromegaly  Heart: regular rate and rhythm  Lungs: clear to auscultation without rales, rhonci  Breasts: nontender, no masses, no discoloration, no discharge  Abdomen: soft, nontender, no masses  Ext genitalia: no lesions, no discoloration  Urethra: no discharge or erythema  Bladder: nontender, good support  Vagina: no discharge, no lesions  Cervix: surgically removed  Adnexa: nontender, no masses  Uterus: surgically removed          "

## 2025-04-05 DIAGNOSIS — E03.9 HYPOTHYROIDISM, UNSPECIFIED TYPE: ICD-10-CM

## 2025-04-06 RX ORDER — LEVOTHYROXINE SODIUM 100 UG/1
100 TABLET ORAL DAILY
Qty: 90 TABLET | Refills: 1 | Status: SHIPPED | OUTPATIENT
Start: 2025-04-06

## 2025-06-03 DIAGNOSIS — I10 PRIMARY HYPERTENSION: ICD-10-CM

## 2025-06-03 RX ORDER — LISINOPRIL 10 MG/1
10 TABLET ORAL DAILY
Qty: 90 TABLET | Refills: 1 | Status: SHIPPED | OUTPATIENT
Start: 2025-06-03 | End: 2025-11-30

## 2025-06-03 NOTE — TELEPHONE ENCOUNTER
Reason for call:   [x] Refill   [] Prior Auth  [] Other:     Office:   [x] PCP/Provider - [facility]  Authorized By: Kelli Paz  [] Specialty/Provider -     Medication: lisinopril (ZESTRIL) 10 mg tablet     Dose/Frequency: Take 1 tablet (10 mg total) by mouth daily     Quantity: 90    Pharmacy: [facility] [address] [facility]   Does the patient have enough for 3 days?   [] Yes   [] No - Send as HP to POD    Mail Away Pharmacy   Does the patient have enough for 10 days?   [] Yes   [x] No - Send as HP to POD-1 week left

## 2025-06-20 ENCOUNTER — TELEPHONE (OUTPATIENT)
Age: 55
End: 2025-06-20

## 2025-06-30 ENCOUNTER — RA CDI HCC (OUTPATIENT)
Dept: OTHER | Facility: HOSPITAL | Age: 55
End: 2025-06-30

## 2025-07-01 ENCOUNTER — APPOINTMENT (OUTPATIENT)
Age: 55
End: 2025-07-01
Payer: COMMERCIAL

## 2025-07-01 DIAGNOSIS — E78.2 MIXED HYPERLIPIDEMIA: ICD-10-CM

## 2025-07-01 LAB
CHOLEST SERPL-MCNC: 288 MG/DL (ref ?–200)
HDLC SERPL-MCNC: 92 MG/DL
LDLC SERPL CALC-MCNC: 177 MG/DL (ref 0–100)
NONHDLC SERPL-MCNC: 196 MG/DL
TRIGL SERPL-MCNC: 95 MG/DL (ref ?–150)

## 2025-07-01 PROCEDURE — 80061 LIPID PANEL: CPT

## 2025-07-01 PROCEDURE — 36415 COLL VENOUS BLD VENIPUNCTURE: CPT

## 2025-07-08 ENCOUNTER — OFFICE VISIT (OUTPATIENT)
Age: 55
End: 2025-07-08
Payer: COMMERCIAL

## 2025-07-08 VITALS
SYSTOLIC BLOOD PRESSURE: 140 MMHG | HEART RATE: 84 BPM | HEIGHT: 63 IN | OXYGEN SATURATION: 98 % | BODY MASS INDEX: 28.09 KG/M2 | DIASTOLIC BLOOD PRESSURE: 82 MMHG | WEIGHT: 158.51 LBS

## 2025-07-08 DIAGNOSIS — I10 PRIMARY HYPERTENSION: ICD-10-CM

## 2025-07-08 DIAGNOSIS — R01.1 HEART MURMUR: ICD-10-CM

## 2025-07-08 DIAGNOSIS — R74.8 ELEVATED LIVER ENZYMES: ICD-10-CM

## 2025-07-08 DIAGNOSIS — E78.2 MIXED HYPERLIPIDEMIA: ICD-10-CM

## 2025-07-08 DIAGNOSIS — Z00.00 ANNUAL PHYSICAL EXAM: Primary | ICD-10-CM

## 2025-07-08 PROCEDURE — 99396 PREV VISIT EST AGE 40-64: CPT

## 2025-07-08 RX ORDER — LISINOPRIL 10 MG/1
20 TABLET ORAL DAILY
Qty: 180 TABLET | Refills: 1 | Status: SHIPPED | OUTPATIENT
Start: 2025-07-08 | End: 2026-01-04

## 2025-07-08 NOTE — PROGRESS NOTES
Name: Jazzmine Hand      : 1970      MRN: [id number]  Encounter Provider: Kelli Paz  Encounter Date: 2025   Encounter department: [facility] PRIMARY CARE  :  Assessment & Plan  Annual physical exam  Patient seen in the office today for annual physical; denies any complaints or concerns    UTD On dental exams  Patient does not wear glasses or contacts   Reviewed preventative screenings   Declines vaccinations    F/u in 1 year   Primary hypertension  Pt follows with [facility] cardiology    Will increase lisinpril to 20mg daily- denies chest pain, headache, blurred vision or dizziness    Declines repeat BP  Will send in readings from the next 14 days     Educated on s/s of when to follow up sooner or seek emergent care    BMI 28.0-28.9,adult    BMI Counseling: Body mass index is 28.53 kg/m². The BMI is above normal. Nutrition recommendations include decreasing overall calorie intake, 3-5 servings of fruits/vegetables daily, reducing fast food intake, and consuming healthier snacks. Exercise recommendations include exercising 3-5 times per week.  Elevated liver enzymes  Follows with GI and US On liver was normal on 2025    Repeat labs ordered       Heart murmur  Patient will sign medical release for records from [facility] cardiology-   Mixed hyperlipidemia  Follows with [facility] cardiology     Denies chest pain or shortness of breath         BMI Counseling: Body mass index is 28.53 kg/m². The BMI is above normal. Exercise recommendations include exercising 3-5 times per week.       History of Present Illness   HPI  Jazzmine Hand is a 54 year old female seen in the office today for annual physical; denies any complaints or concerns    UTD On dental exams  Patient does not wear glasses or contacts   Reviewed preventative screenings   Declines vaccinations    F/u in 1 year   Review of Systems   Constitutional:  Negative for chills and fever.   HENT:  Negative for ear pain and sore throat.   "  Eyes:  Negative for pain and visual disturbance.   Respiratory:  Negative for cough and shortness of breath.    Cardiovascular:  Negative for chest pain and palpitations.   Gastrointestinal:  Negative for abdominal pain and vomiting.   Genitourinary:  Negative for dysuria and hematuria.   Musculoskeletal:  Negative for arthralgias and back pain.   Skin:  Negative for color change and rash.   Neurological:  Negative for dizziness, seizures, syncope and headaches.   All other systems reviewed and are negative.      Objective   /82 (BP Location: Left arm, Patient Position: Sitting, Cuff Size: Adult)   Pulse 84   Ht 5' 2.5\" (1.588 m)   Wt 71.9 kg (158 lb 8.2 oz)   SpO2 98%   BMI 28.53 kg/m²      Physical Exam  Vitals and nursing note reviewed.   Constitutional:       General: She is not in acute distress.     Appearance: She is well-developed.   HENT:      Head: Normocephalic and atraumatic.      Right Ear: Tympanic membrane, ear canal and external ear normal.      Left Ear: Tympanic membrane, ear canal and external ear normal.      Nose: Nose normal.      Mouth/Throat:      Mouth: Mucous membranes are moist.     Eyes:      Conjunctiva/sclera: Conjunctivae normal.       Cardiovascular:      Rate and Rhythm: Normal rate and regular rhythm.      Pulses: Normal pulses.      Heart sounds: Normal heart sounds. No murmur heard.  Pulmonary:      Effort: Pulmonary effort is normal. No respiratory distress.      Breath sounds: Normal breath sounds.   Abdominal:      Palpations: Abdomen is soft.      Tenderness: There is no abdominal tenderness.     Musculoskeletal:         General: No swelling. Normal range of motion.      Cervical back: Neck supple.      Right lower leg: No edema.      Left lower leg: No edema.     Skin:     General: Skin is warm and dry.     Neurological:      Mental Status: She is alert. Mental status is at baseline.     Psychiatric:         Mood and Affect: Mood normal.         Answers submitted " by the patient for this visit:  Annual Physical (Submitted on 7/8/2025)  Diet/Nutrition choices: no special diet  Exercise choices: walking  Sleep choices: 4-6 hours of sleep on average  Hearing choices: normal hearing bilateral ears  Vision choices: no vision problems  Dental choices: regular dental visits  Do you currently have an OB/GYN provider that you routinely follow with?: Yes  Menopausal status: postmenopausal  Any history of sexual transmitted disease/infection?: No  Contraception: hysterectomy  Do you have an advance directive/living will?: No  Do you have a durable power of  (POA)?: No

## 2025-07-08 NOTE — ASSESSMENT & PLAN NOTE
BMI Counseling: Body mass index is 28.53 kg/m². The BMI is above normal. Nutrition recommendations include decreasing overall calorie intake, 3-5 servings of fruits/vegetables daily, reducing fast food intake, and consuming healthier snacks. Exercise recommendations include exercising 3-5 times per week.

## 2025-07-08 NOTE — PATIENT INSTRUCTIONS
"Patient Education     Routine physical for adults   The Basics   Written by the doctors and editors at [facility]   What is a physical? -- A physical is a routine visit, or \"check-up,\" with your doctor. You might also hear it called a \"wellness visit\" or \"preventive visit.\"  During each visit, the doctor will:   Ask about your physical and mental health   Ask about your habits, behaviors, and lifestyle   Do an exam   Give you vaccines if needed   Talk to you about any medicines you take   Give advice about your health   Answer your questions  Getting regular check-ups is an important part of taking care of your health. It can help your doctor find and treat any problems you have. But it's also important for preventing health problems.  A routine physical is different from a \"sick visit.\" A sick visit is when you see a doctor because of a health concern or problem. Since physicals are scheduled ahead of time, you can think about what you want to ask the doctor.  How often should I get a physical? -- It depends on your age and health. In general, for people age 21 years and older:   If you are younger than 50 years, you might be able to get a physical every 3 years.   If you are 50 years or older, your doctor might recommend a physical every year.  If you have an ongoing health condition, like diabetes or high blood pressure, your doctor will probably want to see you more often.  What happens during a physical? -- In general, each visit will include:   Physical exam - The doctor or nurse will check your height, weight, heart rate, and blood pressure. They will also look at your eyes and ears. They will ask about how you are feeling and whether you have any symptoms that bother you.   Medicines - It's a good idea to bring a list of all the medicines you take to each doctor visit. Your doctor will talk to you about your medicines and answer any questions. Tell them if you are having any side effects that bother you. You " "should also tell them if you are having trouble paying for any of your medicines.   Habits and behaviors - This includes:   Your diet   Your exercise habits   Whether you smoke, drink alcohol, or use drugs   Whether you are sexually active   Whether you feel safe at home  Your doctor will talk to you about things you can do to improve your health and lower your risk of health problems. They will also offer help and support. For example, if you want to quit smoking, they can give you advice and might prescribe medicines. If you want to improve your diet or get more physical activity, they can help you with this, too.   Lab tests, if needed - The tests you get will depend on your age and situation. For example, your doctor might want to check your:   Cholesterol   Blood sugar   Iron level   Vaccines - The recommended vaccines will depend on your age, health, and what vaccines you already had. Vaccines are very important because they can prevent certain serious or deadly infections.   Discussion of screening - \"Screening\" means checking for diseases or other health problems before they cause symptoms. Your doctor can recommend screening based on your age, risk, and preferences. This might include tests to check for:   Cancer, such as breast, prostate, cervical, ovarian, colorectal, prostate, lung, or skin cancer   Sexually transmitted infections, such as chlamydia and gonorrhea   Mental health conditions like depression and anxiety  Your doctor will talk to you about the different types of screening tests. They can help you decide which screenings to have. They can also explain what the results might mean.   Answering questions - The physical is a good time to ask the doctor or nurse questions about your health. If needed, they can refer you to other doctors or specialists, too.  Adults older than 65 years often need other care, too. As you get older, your doctor will talk to you about:   How to prevent falling at " home   Hearing or vision tests   Memory testing   How to take your medicines safely   Making sure that you have the help and support you need at home  All topics are updated as new evidence becomes available and our peer review process is complete.  This topic retrieved from [employer] on: May 02, 2024.  Topic 029099 Version 1.0  Release: 32.4.3 - C32.122  © 2024 UpToDate, Inc. and/or its affiliates. All rights reserved.  Consumer Information Use and Disclaimer   Disclaimer: This generalized information is a limited summary of diagnosis, treatment, and/or medication information. It is not meant to be comprehensive and should be used as a tool to help the user understand and/or assess potential diagnostic and treatment options. It does NOT include all information about conditions, treatments, medications, side effects, or risks that may apply to a specific patient. It is not intended to be medical advice or a substitute for the medical advice, diagnosis, or treatment of a health care provider based on the health care provider's examination and assessment of a patient's specific and unique circumstances. Patients must speak with a health care provider for complete information about their health, medical questions, and treatment options, including any risks or benefits regarding use of medications. This information does not endorse any treatments or medications as safe, effective, or approved for treating a specific patient. Up[employer]Date, Inc. and its affiliates disclaim any warranty or liability relating to this information or the use thereof.The use of this information is governed by the Terms of Use, available at https://www.[employer].com/en/know/clinical-effectiveness-terms. 2024© UpToDate, Inc. and its affiliates and/or licensors. All rights reserved.  Copyright   © 2024 Up[employer]Date, Inc. and/or its affiliates. All rights reserved.

## 2025-07-08 NOTE — ASSESSMENT & PLAN NOTE
Pt follows with [facility] cardiology    Will increase lisinpril to 20mg daily- denies chest pain, headache, blurred vision or dizziness    Declines repeat BP  Will send in readings from the next 14 days     Educated on s/s of when to follow up sooner or seek emergent care